# Patient Record
Sex: FEMALE | Race: WHITE | Employment: FULL TIME | URBAN - NONMETROPOLITAN AREA
[De-identification: names, ages, dates, MRNs, and addresses within clinical notes are randomized per-mention and may not be internally consistent; named-entity substitution may affect disease eponyms.]

---

## 2024-05-15 ENCOUNTER — HOSPITAL ENCOUNTER (OUTPATIENT)
Dept: CT IMAGING | Age: 59
Discharge: HOME | End: 2024-05-15
Attending: RADIOLOGY

## 2024-05-15 DIAGNOSIS — Z00.6 EXAMINATION FOR NORMAL COMPARISON FOR CLINICAL RESEARCH: ICD-10-CM

## 2024-05-16 ENCOUNTER — NURSE ONLY (OUTPATIENT)
Dept: LAB | Age: 59
End: 2024-05-16

## 2024-05-16 ENCOUNTER — OFFICE VISIT (OUTPATIENT)
Dept: PULMONOLOGY | Age: 59
End: 2024-05-16

## 2024-05-16 VITALS
TEMPERATURE: 97.3 F | HEART RATE: 90 BPM | DIASTOLIC BLOOD PRESSURE: 92 MMHG | SYSTOLIC BLOOD PRESSURE: 122 MMHG | BODY MASS INDEX: 33.75 KG/M2 | HEIGHT: 62 IN | WEIGHT: 183.4 LBS | OXYGEN SATURATION: 91 %

## 2024-05-16 DIAGNOSIS — R05.3 CHRONIC COUGH: Primary | ICD-10-CM

## 2024-05-16 DIAGNOSIS — R09.02 HYPOXIA: ICD-10-CM

## 2024-05-16 DIAGNOSIS — R91.8 ABNORMAL CT LUNG SCREENING: ICD-10-CM

## 2024-05-16 DIAGNOSIS — J47.0 BRONCHIECTASIS WITH ACUTE LOWER RESPIRATORY INFECTION (HCC): ICD-10-CM

## 2024-05-16 DIAGNOSIS — K21.9 GASTROESOPHAGEAL REFLUX DISEASE WITHOUT ESOPHAGITIS: ICD-10-CM

## 2024-05-16 PROBLEM — E66.01 CLASS 2 SEVERE OBESITY DUE TO EXCESS CALORIES WITH SERIOUS COMORBIDITY AND BODY MASS INDEX (BMI) OF 35.0 TO 35.9 IN ADULT (HCC): Chronic | Status: ACTIVE | Noted: 2021-08-03

## 2024-05-16 PROBLEM — E66.812 CLASS 2 SEVERE OBESITY DUE TO EXCESS CALORIES WITH SERIOUS COMORBIDITY AND BODY MASS INDEX (BMI) OF 35.0 TO 35.9 IN ADULT: Chronic | Status: ACTIVE | Noted: 2021-08-03

## 2024-05-16 PROBLEM — R73.03 PREDIABETES: Status: ACTIVE | Noted: 2020-06-30

## 2024-05-16 PROBLEM — E78.2 MIXED HYPERLIPIDEMIA: Chronic | Status: ACTIVE | Noted: 2020-03-04

## 2024-05-16 PROBLEM — F45.8 ANXIETY DIARRHEA: Chronic | Status: ACTIVE | Noted: 2020-03-04

## 2024-05-16 PROBLEM — G47.33 OSA ON CPAP: Status: ACTIVE | Noted: 2021-04-20

## 2024-05-16 RX ORDER — PRAVASTATIN SODIUM 40 MG
1 TABLET ORAL NIGHTLY
COMMUNITY
Start: 2024-04-29

## 2024-05-16 RX ORDER — SODIUM CHLORIDE 9 MG/ML
INJECTION, SOLUTION INTRAVENOUS CONTINUOUS
Status: DISCONTINUED | OUTPATIENT
Start: 2024-05-16 | End: 2024-05-17 | Stop reason: HOSPADM

## 2024-05-16 RX ORDER — PREDNISONE 50 MG/1
TABLET ORAL
Status: ON HOLD | COMMUNITY
Start: 2024-04-23 | End: 2024-05-18 | Stop reason: HOSPADM

## 2024-05-16 RX ORDER — SODIUM CHLORIDE FOR INHALATION 3 %
4 VIAL, NEBULIZER (ML) INHALATION 2 TIMES DAILY
Qty: 240 ML | Refills: 11 | Status: ON HOLD | OUTPATIENT
Start: 2024-05-16 | End: 2024-05-18 | Stop reason: HOSPADM

## 2024-05-16 RX ORDER — HYDROCHLOROTHIAZIDE 12.5 MG/1
12.5 TABLET ORAL DAILY
Status: ON HOLD | COMMUNITY
Start: 2024-01-31 | End: 2024-05-18 | Stop reason: HOSPADM

## 2024-05-16 RX ORDER — TRIAMCINOLONE ACETONIDE 1 MG/G
CREAM TOPICAL DAILY PRN
COMMUNITY
Start: 2022-08-09

## 2024-05-16 RX ORDER — OMEPRAZOLE 40 MG/1
40 CAPSULE, DELAYED RELEASE ORAL NIGHTLY
COMMUNITY
Start: 2024-05-02

## 2024-05-16 RX ORDER — CEFUROXIME AXETIL 500 MG/1
500 TABLET ORAL 2 TIMES DAILY
Status: ON HOLD | COMMUNITY
End: 2024-05-18 | Stop reason: HOSPADM

## 2024-05-16 RX ORDER — DEXTROMETHORPHAN HYDROBROMIDE AND PROMETHAZINE HYDROCHLORIDE 15; 6.25 MG/5ML; MG/5ML
SYRUP ORAL
Status: ON HOLD | COMMUNITY
Start: 2024-04-11 | End: 2024-05-18 | Stop reason: HOSPADM

## 2024-05-16 RX ORDER — CITALOPRAM HYDROBROMIDE 10 MG/1
TABLET ORAL
COMMUNITY
Start: 2024-04-17

## 2024-05-16 ASSESSMENT — ENCOUNTER SYMPTOMS
SHORTNESS OF BREATH: 1
HEARTBURN: 0
HEMOPTYSIS: 0
COUGH: 1

## 2024-05-16 NOTE — PROGRESS NOTES
[facility]    Patient: BABS MERCER, 58 y.o.   : 1965  2024         Subjective     Chief Complaint   Patient presents with    Follow-up     New pulm consult. Lung screenings from  &  in chart. Pft ordered not completed yet ( 5.22.24)        Cough  Episode onset: started around . The problem has been waxing and waning (depends on the part of the day,). The cough is Productive of sputum. Associated symptoms include shortness of breath (only after coughing), weight loss (almost 10 lbs) and wheezing (was wheezing before but has started to stop). Pertinent negatives include no chest pain, fever, heartburn, hemoptysis, nasal congestion, postnasal drip or rash. The symptoms are aggravated by lying down. She has tried oral steroids (tried allergy meds, then got steroid shot, no improvement. Got doxy and some prednisone without help.) for the symptoms. The treatment provided no relief.   No significant family history of pulmonary issues.  No prior lung issues per patient.  Does have smoking history however quit  and has not had any issues since.  No significant exposures at this time either.  Not immunosuppressed or immunocompromised.   Did have LDCT chest performed for lung cancer screening which did show some tree-in-bud opacities some mild bronchiectatic changes in the lower lobes and right middle lobe.  Bringing up significant mucus with her cough, no hemoptysis though.  Does have 10 pound weight loss unintentionally over the past few months    Patient has not been admitted or treated for pneumonia, pulmonary embolism, or respiratory failure.  Patient has not been intubated for reasons other than planned procedures.          Past Medical hx   PMH:  Past Medical History:   Diagnosis Date    Former smoker     Sleep apnea      SURGICAL HISTORY:  Past Surgical History:   Procedure Laterality Date    APPENDECTOMY      BOWEL RESECTION      CHOLECYSTECTOMY

## 2024-05-17 ENCOUNTER — ANESTHESIA EVENT (OUTPATIENT)
Dept: ENDOSCOPY | Age: 59
End: 2024-05-17
Payer: COMMERCIAL

## 2024-05-17 ENCOUNTER — APPOINTMENT (OUTPATIENT)
Dept: GENERAL RADIOLOGY | Age: 59
DRG: 166 | End: 2024-05-17
Attending: INTERNAL MEDICINE
Payer: COMMERCIAL

## 2024-05-17 ENCOUNTER — HOSPITAL ENCOUNTER (INPATIENT)
Age: 59
LOS: 1 days | Discharge: HOME | DRG: 166 | End: 2024-05-18
Attending: INTERNAL MEDICINE
Payer: COMMERCIAL

## 2024-05-17 ENCOUNTER — ANESTHESIA (OUTPATIENT)
Dept: ENDOSCOPY | Age: 59
End: 2024-05-17
Payer: COMMERCIAL

## 2024-05-17 PROBLEM — R93.89 ABNORMAL CT OF THE CHEST: Status: ACTIVE | Noted: 2024-05-17

## 2024-05-17 PROBLEM — J18.9 PNEUMONIA OF BOTH LUNGS DUE TO INFECTIOUS ORGANISM: Status: ACTIVE | Noted: 2024-05-17

## 2024-05-17 PROBLEM — R09.02 HYPOXIA: Status: ACTIVE | Noted: 2024-05-17

## 2024-05-17 PROBLEM — R05.3 CHRONIC COUGH: Status: ACTIVE | Noted: 2024-05-17

## 2024-05-17 PROBLEM — J96.01 ACUTE HYPOXIC RESPIRATORY FAILURE (HCC): Status: ACTIVE | Noted: 2024-05-17

## 2024-05-17 LAB
ACINETOBACTER CALCOACETICUS-BAUMANNII BY PCR: NOT DETECTED
ANION GAP SERPL CALC-SCNC: 10 MEQ/L (ref 8–16)
BAL CHARACTER: ABNORMAL
BAL COLLECTION SITE: ABNORMAL
BAL COLOR: COLORLESS
BASOPHILS ABSOLUTE: 0 THOU/MM3 (ref 0–0.1)
BASOPHILS NFR BLD AUTO: 0.4 %
BILIRUB UR QL STRIP.AUTO: NEGATIVE
BLACTX-M ISLT/SPM QL: NORMAL
BLAIMP ISLT/SPM QL: NORMAL
BLAKPC ISLT/SPM QL: NORMAL
BLAOXA-48-LIKE ISLT/SPM QL: NORMAL
BLAVIM ISLT/SPM QL: NORMAL
BUN SERPL-MCNC: 8 MG/DL (ref 7–22)
C PNEUM DNA LOWER RESP QL NAA+NON-PROBE: NOT DETECTED
CA-I BLD ISE-SCNC: 1.2 MMOL/L (ref 1.12–1.32)
CALCIUM SERPL-MCNC: 9 MG/DL (ref 8.5–10.5)
CHARACTER UR: CLEAR
CHLORIDE SERPL-SCNC: 105 MEQ/L (ref 98–111)
CO2 SERPL-SCNC: 25 MEQ/L (ref 23–33)
COLOR: YELLOW
CREAT SERPL-MCNC: 0.8 MG/DL (ref 0.4–1.2)
CYTOLOGY-NON GYN: NORMAL
CYTOLOGY-NON GYN: NORMAL
DEPRECATED RDW RBC AUTO: 47.7 FL (ref 35–45)
EKG ATRIAL RATE: 77 BPM
EKG P AXIS: 34 DEGREES
EKG P-R INTERVAL: 154 MS
EKG Q-T INTERVAL: 398 MS
EKG QRS DURATION: 84 MS
EKG QTC CALCULATION (BAZETT): 450 MS
EKG R AXIS: 23 DEGREES
EKG T AXIS: 26 DEGREES
EKG VENTRICULAR RATE: 77 BPM
ENTEROBACTER CLOACAE COMPLEX BY PCR: NOT DETECTED
EOSINOPHIL NFR BLD AUTO: 0.8 %
EOSINOPHILS ABSOLUTE: 0.1 THOU/MM3 (ref 0–0.4)
ERYTHROCYTE [DISTWIDTH] IN BLOOD BY AUTOMATED COUNT: 14.1 % (ref 11.5–14.5)
ERYTHROCYTE [SEDIMENTATION RATE] IN BLOOD BY WESTERGREN METHOD: 33 MM/HR (ref 0–20)
ESCHERICHIA COLI BY PCR: NOT DETECTED
FLUAV RNA LOWER RESP QL NAA+NON-PROBE: NOT DETECTED
FLUBV RNA LOWER RESP QL NAA+NON-PROBE: NOT DETECTED
FUNGUS SPEC FUNGUS STN: NORMAL
FUNGUS SPEC FUNGUS STN: NORMAL
GFR SERPL CREATININE-BSD FRML MDRD: 85 ML/MIN/1.73M2
GLUCOSE BLD STRIP.AUTO-MCNC: 142 MG/DL (ref 70–108)
GLUCOSE SERPL-MCNC: 121 MG/DL (ref 70–108)
GLUCOSE UR QL STRIP.AUTO: NEGATIVE MG/DL
HADV DNA LOWER RESP QL NAA+NON-PROBE: NOT DETECTED
HAEMOPHILUS INFLUENZAE BY PCR: NOT DETECTED
HCOV RNA LOWER RESP QL NAA+NON-PROBE: NOT DETECTED
HCT VFR BLD AUTO: 39.2 % (ref 37–47)
HGB BLD-MCNC: 12.6 GM/DL (ref 12–16)
HGB UR QL STRIP.AUTO: NEGATIVE
HMPV RNA LOWER RESP QL NAA+NON-PROBE: NOT DETECTED
HPIV RNA LOWER RESP QL NAA+NON-PROBE: NOT DETECTED
IGA SERPL-MCNC: 63 MG/DL (ref 70–400)
IGG SERPL-MCNC: 1056 MG/DL (ref 700–1600)
IGM SERPL-MCNC: 47 MG/DL (ref 40–230)
IMM GRANULOCYTES # BLD AUTO: 0.04 THOU/MM3 (ref 0–0.07)
IMM GRANULOCYTES NFR BLD AUTO: 0.4 %
KETONES UR QL STRIP.AUTO: NEGATIVE
KLEBSIELLA AEROGENES BY PCR: NOT DETECTED
KLEBSIELLA OXYTOCA BY PCR: NOT DETECTED
KLEBSIELLA PNEUMONIAE GROUP BY PCR: NOT DETECTED
L PNEUMO DNA LOWER RESP QL NAA+NON-PROBE: NOT DETECTED
L PNEUMO1 AG UR QL IA.RAPID: NEGATIVE
LACTATE SERPL-SCNC: 0.8 MMOL/L (ref 0.5–2)
LYMPHOCYTES ABSOLUTE: 0.6 THOU/MM3 (ref 1–4.8)
LYMPHOCYTES NFR BLD AUTO: 5.6 %
LYMPHOCYTES NFR BRONCH MANUAL: 10 % (ref 10–15)
M PNEUMO DNA LOWER RESP QL NAA+NON-PROBE: NOT DETECTED
MAGNESIUM SERPL-MCNC: 2.1 MG/DL (ref 1.6–2.4)
MCH RBC QN AUTO: 29.9 PG (ref 26–33)
MCHC RBC AUTO-ENTMCNC: 32.1 GM/DL (ref 32.2–35.5)
MCV RBC AUTO: 92.9 FL (ref 81–99)
MONOCYTES ABSOLUTE: 0.1 THOU/MM3 (ref 0.4–1.3)
MONOCYTES NFR BLD AUTO: 1.3 %
MORAXELLA CATARRHALIS BY PCR: NOT DETECTED
MRSA DNA SPEC QL NAA+PROBE: NEGATIVE
NEUTROPHILS ABSOLUTE: 9.1 THOU/MM3 (ref 1.8–7.7)
NEUTROPHILS NFR BLD AUTO: 91.5 %
NEUTROPHILS NFR BRONCH MANUAL: 90 % (ref 0–3)
NITRITE UR QL STRIP: NEGATIVE
NRBC BLD AUTO-RTO: 0 /100 WBC
PATHOLOGIST REVIEW: ABNORMAL
PH UR STRIP.AUTO: 7 [PH] (ref 5–9)
PLATELET # BLD AUTO: 257 THOU/MM3 (ref 130–400)
PMV BLD AUTO: 9 FL (ref 9.4–12.4)
POTASSIUM SERPL-SCNC: 4.3 MEQ/L (ref 3.5–5.2)
PROCALCITONIN SERPL IA-MCNC: 0.07 NG/ML (ref 0.01–0.09)
PROT UR STRIP.AUTO-MCNC: NEGATIVE MG/DL
PROTEUS SPECIES BY PCR: NOT DETECTED
PSEUDOMONAS AERUGINOSA BY PCR: NOT DETECTED
RBC # BLD AUTO: 4.22 MILL/MM3 (ref 4.2–5.4)
RBC BAL: 135 /CUMM
RESISTANT GENE MECA/C & MREJ BY PCR: NORMAL
RESISTANT GENE NDM BY PCR: NORMAL
RSV RNA LOWER RESP QL NAA+NON-PROBE: NOT DETECTED
RV+EV RNA LOWER RESP QL NAA+NON-PROBE: NOT DETECTED
SERRATIA MARCESCENS BY PCR: NOT DETECTED
SODIUM SERPL-SCNC: 140 MEQ/L (ref 135–145)
SOURCE: NORMAL
SP GR UR REFRACT.AUTO: 1.01 (ref 1–1.03)
SPECIMEN ACCEPTABILITY: NORMAL
SPECIMEN SOURCE: NORMAL
STAPH AUREUS BY PCR: NOT DETECTED
STREP AGALACTIAE BY PCR: NOT DETECTED
STREP PNEUMO AG, UR: NEGATIVE
STREP PNEUMONIAE BY PCR: NOT DETECTED
STREP PYOGENES BY PCR: NOT DETECTED
TOTAL NUCLEATED CELLS BAL: 4090 /CUMM
TOTAL VOLUME RECEIVED BAL: 53 ML
UROBILINOGEN, URINE: 0.2 EU/DL (ref 0–1)
WBC # BLD AUTO: 9.9 THOU/MM3 (ref 4.8–10.8)
WBC #/AREA URNS HPF: NEGATIVE /[HPF]

## 2024-05-17 PROCEDURE — 89051 BODY FLUID CELL COUNT: CPT

## 2024-05-17 PROCEDURE — 87305 ASPERGILLUS AG IA: CPT

## 2024-05-17 PROCEDURE — 85025 COMPLETE CBC W/AUTO DIFF WBC: CPT

## 2024-05-17 PROCEDURE — 93010 ELECTROCARDIOGRAM REPORT: CPT | Performed by: INTERNAL MEDICINE

## 2024-05-17 PROCEDURE — 94761 N-INVAS EAR/PLS OXIMETRY MLT: CPT

## 2024-05-17 PROCEDURE — 6370000000 HC RX 637 (ALT 250 FOR IP)

## 2024-05-17 PROCEDURE — 82784 ASSAY IGA/IGD/IGG/IGM EACH: CPT

## 2024-05-17 PROCEDURE — 2060000000 HC ICU INTERMEDIATE R&B

## 2024-05-17 PROCEDURE — 87077 CULTURE AEROBIC IDENTIFY: CPT

## 2024-05-17 PROCEDURE — 99222 1ST HOSP IP/OBS MODERATE 55: CPT | Performed by: STUDENT IN AN ORGANIZED HEALTH CARE EDUCATION/TRAINING PROGRAM

## 2024-05-17 PROCEDURE — 87070 CULTURE OTHR SPECIMN AEROBIC: CPT

## 2024-05-17 PROCEDURE — 87899 AGENT NOS ASSAY W/OPTIC: CPT

## 2024-05-17 PROCEDURE — 87449 NOS EACH ORGANISM AG IA: CPT

## 2024-05-17 PROCEDURE — 7100000000 HC PACU RECOVERY - FIRST 15 MIN: Performed by: INTERNAL MEDICINE

## 2024-05-17 PROCEDURE — 3700000001 HC ADD 15 MINUTES (ANESTHESIA): Performed by: INTERNAL MEDICINE

## 2024-05-17 PROCEDURE — 3609027000 HC BRONCHOSCOPY: Performed by: INTERNAL MEDICINE

## 2024-05-17 PROCEDURE — A4216 STERILE WATER/SALINE, 10 ML: HCPCS

## 2024-05-17 PROCEDURE — 87206 SMEAR FLUORESCENT/ACID STAI: CPT

## 2024-05-17 PROCEDURE — 83605 ASSAY OF LACTIC ACID: CPT

## 2024-05-17 PROCEDURE — 2580000003 HC RX 258

## 2024-05-17 PROCEDURE — 85651 RBC SED RATE NONAUTOMATED: CPT

## 2024-05-17 PROCEDURE — 87486 CHLMYD PNEUM DNA AMP PROBE: CPT

## 2024-05-17 PROCEDURE — 86038 ANTINUCLEAR ANTIBODIES: CPT

## 2024-05-17 PROCEDURE — 0WCQ8ZZ EXTIRPATION OF MATTER FROM RESPIRATORY TRACT, VIA NATURAL OR ARTIFICIAL OPENING ENDOSCOPIC: ICD-10-PCS | Performed by: INTERNAL MEDICINE

## 2024-05-17 PROCEDURE — 2580000003 HC RX 258: Performed by: INTERNAL MEDICINE

## 2024-05-17 PROCEDURE — 88305 TISSUE EXAM BY PATHOLOGIST: CPT

## 2024-05-17 PROCEDURE — 88312 SPECIAL STAINS GROUP 1: CPT

## 2024-05-17 PROCEDURE — 88185 FLOWCYTOMETRY/TC ADD-ON: CPT

## 2024-05-17 PROCEDURE — 5A09357 ASSISTANCE WITH RESPIRATORY VENTILATION, LESS THAN 24 CONSECUTIVE HOURS, CONTINUOUS POSITIVE AIRWAY PRESSURE: ICD-10-PCS | Performed by: INTERNAL MEDICINE

## 2024-05-17 PROCEDURE — 31624 DX BRONCHOSCOPE/LAVAGE: CPT | Performed by: INTERNAL MEDICINE

## 2024-05-17 PROCEDURE — 87798 DETECT AGENT NOS DNA AMP: CPT

## 2024-05-17 PROCEDURE — 82948 REAGENT STRIP/BLOOD GLUCOSE: CPT

## 2024-05-17 PROCEDURE — 87385 HISTOPLASMA CAPSUL AG IA: CPT

## 2024-05-17 PROCEDURE — 82330 ASSAY OF CALCIUM: CPT

## 2024-05-17 PROCEDURE — 82787 IGG 1 2 3 OR 4 EACH: CPT

## 2024-05-17 PROCEDURE — 87529 HSV DNA AMP PROBE: CPT

## 2024-05-17 PROCEDURE — 81003 URINALYSIS AUTO W/O SCOPE: CPT

## 2024-05-17 PROCEDURE — 2500000003 HC RX 250 WO HCPCS: Performed by: NURSE ANESTHETIST, CERTIFIED REGISTERED

## 2024-05-17 PROCEDURE — 2700000000 HC OXYGEN THERAPY PER DAY

## 2024-05-17 PROCEDURE — 88184 FLOWCYTOMETRY/ TC 1 MARKER: CPT

## 2024-05-17 PROCEDURE — 93005 ELECTROCARDIOGRAM TRACING: CPT

## 2024-05-17 PROCEDURE — 0B9D8ZX DRAINAGE OF RIGHT MIDDLE LUNG LOBE, VIA NATURAL OR ARTIFICIAL OPENING ENDOSCOPIC, DIAGNOSTIC: ICD-10-PCS | Performed by: INTERNAL MEDICINE

## 2024-05-17 PROCEDURE — 83735 ASSAY OF MAGNESIUM: CPT

## 2024-05-17 PROCEDURE — 88108 CYTOPATH CONCENTRATE TECH: CPT

## 2024-05-17 PROCEDURE — 2500000003 HC RX 250 WO HCPCS

## 2024-05-17 PROCEDURE — 6360000002 HC RX W HCPCS: Performed by: NURSE ANESTHETIST, CERTIFIED REGISTERED

## 2024-05-17 PROCEDURE — 99222 1ST HOSP IP/OBS MODERATE 55: CPT | Performed by: INTERNAL MEDICINE

## 2024-05-17 PROCEDURE — 2709999900 HC NON-CHARGEABLE SUPPLY: Performed by: INTERNAL MEDICINE

## 2024-05-17 PROCEDURE — 87205 SMEAR GRAM STAIN: CPT

## 2024-05-17 PROCEDURE — 87631 RESP VIRUS 3-5 TARGETS: CPT

## 2024-05-17 PROCEDURE — 88112 CYTOPATH CELL ENHANCE TECH: CPT

## 2024-05-17 PROCEDURE — 0B9J8ZX DRAINAGE OF LEFT LOWER LUNG LOBE, VIA NATURAL OR ARTIFICIAL OPENING ENDOSCOPIC, DIAGNOSTIC: ICD-10-PCS | Performed by: INTERNAL MEDICINE

## 2024-05-17 PROCEDURE — 31623 DX BRONCHOSCOPE/BRUSH: CPT | Performed by: INTERNAL MEDICINE

## 2024-05-17 PROCEDURE — 31645 BRNCHSC W/THER ASPIR 1ST: CPT | Performed by: INTERNAL MEDICINE

## 2024-05-17 PROCEDURE — 87581 M.PNEUMON DNA AMP PROBE: CPT

## 2024-05-17 PROCEDURE — 87102 FUNGUS ISOLATION CULTURE: CPT

## 2024-05-17 PROCEDURE — 71045 X-RAY EXAM CHEST 1 VIEW: CPT

## 2024-05-17 PROCEDURE — 94640 AIRWAY INHALATION TREATMENT: CPT

## 2024-05-17 PROCEDURE — 0BD88ZX EXTRACTION OF LEFT UPPER LOBE BRONCHUS, VIA NATURAL OR ARTIFICIAL OPENING ENDOSCOPIC, DIAGNOSTIC: ICD-10-PCS | Performed by: INTERNAL MEDICINE

## 2024-05-17 PROCEDURE — 6360000002 HC RX W HCPCS

## 2024-05-17 PROCEDURE — 87541 LEGION PNEUMO DNA AMP PROB: CPT

## 2024-05-17 PROCEDURE — 36415 COLL VENOUS BLD VENIPUNCTURE: CPT

## 2024-05-17 PROCEDURE — 87641 MR-STAPH DNA AMP PROBE: CPT

## 2024-05-17 PROCEDURE — 87040 BLOOD CULTURE FOR BACTERIA: CPT

## 2024-05-17 PROCEDURE — 87116 MYCOBACTERIA CULTURE: CPT

## 2024-05-17 PROCEDURE — 86200 CCP ANTIBODY: CPT

## 2024-05-17 PROCEDURE — 88104 CYTOPATH FL NONGYN SMEARS: CPT

## 2024-05-17 PROCEDURE — 6370000000 HC RX 637 (ALT 250 FOR IP): Performed by: INTERNAL MEDICINE

## 2024-05-17 PROCEDURE — 80048 BASIC METABOLIC PNL TOTAL CA: CPT

## 2024-05-17 PROCEDURE — 84145 PROCALCITONIN (PCT): CPT

## 2024-05-17 PROCEDURE — 87496 CYTOMEG DNA AMP PROBE: CPT

## 2024-05-17 PROCEDURE — 7100000001 HC PACU RECOVERY - ADDTL 15 MIN: Performed by: INTERNAL MEDICINE

## 2024-05-17 PROCEDURE — 3700000000 HC ANESTHESIA ATTENDED CARE: Performed by: INTERNAL MEDICINE

## 2024-05-17 RX ORDER — ONDANSETRON 4 MG/1
4 TABLET, ORALLY DISINTEGRATING ORAL EVERY 8 HOURS PRN
Status: DISCONTINUED | OUTPATIENT
Start: 2024-05-17 | End: 2024-05-18 | Stop reason: HOSPADM

## 2024-05-17 RX ORDER — IPRATROPIUM BROMIDE AND ALBUTEROL SULFATE 2.5; .5 MG/3ML; MG/3ML
SOLUTION RESPIRATORY (INHALATION)
Status: COMPLETED
Start: 2024-05-17 | End: 2024-05-17

## 2024-05-17 RX ORDER — SUCCINYLCHOLINE/SOD CL,ISO/PF 200MG/10ML
SYRINGE (ML) INTRAVENOUS PRN
Status: DISCONTINUED | OUTPATIENT
Start: 2024-05-17 | End: 2024-05-17 | Stop reason: SDUPTHER

## 2024-05-17 RX ORDER — HYDRALAZINE HYDROCHLORIDE 20 MG/ML
10 INJECTION INTRAMUSCULAR; INTRAVENOUS EVERY 6 HOURS PRN
Status: DISCONTINUED | OUTPATIENT
Start: 2024-05-17 | End: 2024-05-18 | Stop reason: HOSPADM

## 2024-05-17 RX ORDER — IPRATROPIUM BROMIDE AND ALBUTEROL SULFATE 2.5; .5 MG/3ML; MG/3ML
1 SOLUTION RESPIRATORY (INHALATION) ONCE
Status: COMPLETED | OUTPATIENT
Start: 2024-05-17 | End: 2024-05-17

## 2024-05-17 RX ORDER — ALBUTEROL SULFATE 2.5 MG/3ML
2.5 SOLUTION RESPIRATORY (INHALATION)
Status: DISCONTINUED | OUTPATIENT
Start: 2024-05-17 | End: 2024-05-17

## 2024-05-17 RX ORDER — SODIUM CHLORIDE 0.9 % (FLUSH) 0.9 %
5-40 SYRINGE (ML) INJECTION EVERY 12 HOURS SCHEDULED
Status: DISCONTINUED | OUTPATIENT
Start: 2024-05-17 | End: 2024-05-18 | Stop reason: HOSPADM

## 2024-05-17 RX ORDER — POTASSIUM CHLORIDE 20 MEQ/1
40 TABLET, EXTENDED RELEASE ORAL PRN
Status: DISCONTINUED | OUTPATIENT
Start: 2024-05-17 | End: 2024-05-18 | Stop reason: HOSPADM

## 2024-05-17 RX ORDER — DEXTROSE MONOHYDRATE 100 MG/ML
INJECTION, SOLUTION INTRAVENOUS CONTINUOUS PRN
Status: DISCONTINUED | OUTPATIENT
Start: 2024-05-17 | End: 2024-05-18 | Stop reason: HOSPADM

## 2024-05-17 RX ORDER — PANTOPRAZOLE SODIUM 40 MG/1
40 TABLET, DELAYED RELEASE ORAL
Status: DISCONTINUED | OUTPATIENT
Start: 2024-05-17 | End: 2024-05-18 | Stop reason: HOSPADM

## 2024-05-17 RX ORDER — ACETAMINOPHEN 325 MG/1
650 TABLET ORAL EVERY 6 HOURS PRN
Status: DISCONTINUED | OUTPATIENT
Start: 2024-05-17 | End: 2024-05-18 | Stop reason: HOSPADM

## 2024-05-17 RX ORDER — ONDANSETRON 2 MG/ML
4 INJECTION INTRAMUSCULAR; INTRAVENOUS EVERY 6 HOURS PRN
Status: DISCONTINUED | OUTPATIENT
Start: 2024-05-17 | End: 2024-05-18 | Stop reason: HOSPADM

## 2024-05-17 RX ORDER — CETIRIZINE HYDROCHLORIDE 10 MG/1
10 TABLET ORAL DAILY
Status: DISCONTINUED | OUTPATIENT
Start: 2024-05-17 | End: 2024-05-18 | Stop reason: HOSPADM

## 2024-05-17 RX ORDER — ACETAMINOPHEN 650 MG/1
650 SUPPOSITORY RECTAL EVERY 6 HOURS PRN
Status: DISCONTINUED | OUTPATIENT
Start: 2024-05-17 | End: 2024-05-18 | Stop reason: HOSPADM

## 2024-05-17 RX ORDER — POTASSIUM CHLORIDE 7.45 MG/ML
10 INJECTION INTRAVENOUS PRN
Status: DISCONTINUED | OUTPATIENT
Start: 2024-05-17 | End: 2024-05-18 | Stop reason: HOSPADM

## 2024-05-17 RX ORDER — SODIUM CHLORIDE 9 MG/ML
INJECTION, SOLUTION INTRAVENOUS PRN
Status: DISCONTINUED | OUTPATIENT
Start: 2024-05-17 | End: 2024-05-18 | Stop reason: HOSPADM

## 2024-05-17 RX ORDER — MIDAZOLAM HYDROCHLORIDE 1 MG/ML
INJECTION INTRAMUSCULAR; INTRAVENOUS PRN
Status: DISCONTINUED | OUTPATIENT
Start: 2024-05-17 | End: 2024-05-17 | Stop reason: SDUPTHER

## 2024-05-17 RX ORDER — SODIUM CHLORIDE 0.9 % (FLUSH) 0.9 %
5-40 SYRINGE (ML) INJECTION PRN
Status: DISCONTINUED | OUTPATIENT
Start: 2024-05-17 | End: 2024-05-18 | Stop reason: HOSPADM

## 2024-05-17 RX ORDER — FENTANYL CITRATE 50 UG/ML
INJECTION, SOLUTION INTRAMUSCULAR; INTRAVENOUS PRN
Status: DISCONTINUED | OUTPATIENT
Start: 2024-05-17 | End: 2024-05-17 | Stop reason: SDUPTHER

## 2024-05-17 RX ORDER — POLYETHYLENE GLYCOL 3350 17 G/17G
17 POWDER, FOR SOLUTION ORAL DAILY PRN
Status: DISCONTINUED | OUTPATIENT
Start: 2024-05-17 | End: 2024-05-18 | Stop reason: HOSPADM

## 2024-05-17 RX ORDER — CITALOPRAM 20 MG/1
10 TABLET ORAL DAILY
Status: DISCONTINUED | OUTPATIENT
Start: 2024-05-17 | End: 2024-05-18 | Stop reason: HOSPADM

## 2024-05-17 RX ORDER — LIDOCAINE HYDROCHLORIDE 20 MG/ML
INJECTION, SOLUTION EPIDURAL; INFILTRATION; INTRACAUDAL; PERINEURAL PRN
Status: DISCONTINUED | OUTPATIENT
Start: 2024-05-17 | End: 2024-05-17 | Stop reason: SDUPTHER

## 2024-05-17 RX ORDER — PROPOFOL 10 MG/ML
INJECTION, EMULSION INTRAVENOUS PRN
Status: DISCONTINUED | OUTPATIENT
Start: 2024-05-17 | End: 2024-05-17 | Stop reason: SDUPTHER

## 2024-05-17 RX ORDER — MAGNESIUM SULFATE IN WATER 40 MG/ML
2000 INJECTION, SOLUTION INTRAVENOUS PRN
Status: DISCONTINUED | OUTPATIENT
Start: 2024-05-17 | End: 2024-05-18 | Stop reason: HOSPADM

## 2024-05-17 RX ORDER — ONDANSETRON 2 MG/ML
INJECTION INTRAMUSCULAR; INTRAVENOUS PRN
Status: DISCONTINUED | OUTPATIENT
Start: 2024-05-17 | End: 2024-05-17 | Stop reason: SDUPTHER

## 2024-05-17 RX ORDER — IPRATROPIUM BROMIDE AND ALBUTEROL SULFATE 2.5; .5 MG/3ML; MG/3ML
1 SOLUTION RESPIRATORY (INHALATION)
Status: DISCONTINUED | OUTPATIENT
Start: 2024-05-17 | End: 2024-05-18 | Stop reason: HOSPADM

## 2024-05-17 RX ORDER — DEXAMETHASONE SODIUM PHOSPHATE 4 MG/ML
INJECTION, SOLUTION INTRA-ARTICULAR; INTRALESIONAL; INTRAMUSCULAR; INTRAVENOUS; SOFT TISSUE PRN
Status: DISCONTINUED | OUTPATIENT
Start: 2024-05-17 | End: 2024-05-17 | Stop reason: SDUPTHER

## 2024-05-17 RX ORDER — GLUCAGON 1 MG/ML
1 KIT INJECTION PRN
Status: DISCONTINUED | OUTPATIENT
Start: 2024-05-17 | End: 2024-05-18 | Stop reason: HOSPADM

## 2024-05-17 RX ORDER — PRAVASTATIN SODIUM 40 MG
40 TABLET ORAL NIGHTLY
Status: DISCONTINUED | OUTPATIENT
Start: 2024-05-17 | End: 2024-05-18 | Stop reason: HOSPADM

## 2024-05-17 RX ADMIN — PROPOFOL 200 MG: 10 INJECTION, EMULSION INTRAVENOUS at 07:40

## 2024-05-17 RX ADMIN — CITALOPRAM HYDROBROMIDE 10 MG: 20 TABLET ORAL at 14:17

## 2024-05-17 RX ADMIN — FAMOTIDINE 20 MG: 10 INJECTION, SOLUTION INTRAVENOUS at 20:56

## 2024-05-17 RX ADMIN — FENTANYL CITRATE 100 MCG: 50 INJECTION, SOLUTION INTRAMUSCULAR; INTRAVENOUS at 07:41

## 2024-05-17 RX ADMIN — SODIUM CHLORIDE: 9 INJECTION, SOLUTION INTRAVENOUS at 06:36

## 2024-05-17 RX ADMIN — Medication 140 MG: at 07:41

## 2024-05-17 RX ADMIN — IPRATROPIUM BROMIDE AND ALBUTEROL SULFATE 1 DOSE: 2.5; .5 SOLUTION RESPIRATORY (INHALATION) at 20:57

## 2024-05-17 RX ADMIN — LIDOCAINE HYDROCHLORIDE 80 MG: 20 INJECTION, SOLUTION EPIDURAL; INFILTRATION; INTRACAUDAL; PERINEURAL at 07:40

## 2024-05-17 RX ADMIN — IPRATROPIUM BROMIDE AND ALBUTEROL SULFATE 1 DOSE: 2.5; .5 SOLUTION RESPIRATORY (INHALATION) at 17:29

## 2024-05-17 RX ADMIN — PRAVASTATIN SODIUM 40 MG: 40 TABLET ORAL at 20:57

## 2024-05-17 RX ADMIN — CEFTRIAXONE SODIUM 1000 MG: 1 INJECTION, POWDER, FOR SOLUTION INTRAMUSCULAR; INTRAVENOUS at 14:17

## 2024-05-17 RX ADMIN — IPRATROPIUM BROMIDE AND ALBUTEROL SULFATE 1 DOSE: .5; 3 SOLUTION RESPIRATORY (INHALATION) at 08:25

## 2024-05-17 RX ADMIN — ACETAMINOPHEN 650 MG: 325 TABLET ORAL at 21:23

## 2024-05-17 RX ADMIN — MIDAZOLAM 2 MG: 1 INJECTION INTRAMUSCULAR; INTRAVENOUS at 07:37

## 2024-05-17 RX ADMIN — AZITHROMYCIN MONOHYDRATE 500 MG: 500 INJECTION, POWDER, LYOPHILIZED, FOR SOLUTION INTRAVENOUS at 15:02

## 2024-05-17 RX ADMIN — CETIRIZINE HYDROCHLORIDE 10 MG: 10 TABLET, FILM COATED ORAL at 14:17

## 2024-05-17 RX ADMIN — PANTOPRAZOLE SODIUM 40 MG: 40 TABLET, DELAYED RELEASE ORAL at 14:19

## 2024-05-17 RX ADMIN — ONDANSETRON 4 MG: 2 INJECTION INTRAMUSCULAR; INTRAVENOUS at 07:52

## 2024-05-17 RX ADMIN — IPRATROPIUM BROMIDE AND ALBUTEROL SULFATE 1 DOSE: 2.5; .5 SOLUTION RESPIRATORY (INHALATION) at 08:25

## 2024-05-17 RX ADMIN — DEXAMETHASONE SODIUM PHOSPHATE 8 MG: 4 INJECTION, SOLUTION INTRAMUSCULAR; INTRAVENOUS at 07:52

## 2024-05-17 RX ADMIN — ALBUTEROL SULFATE 2.5 MG: 2.5 SOLUTION RESPIRATORY (INHALATION) at 12:22

## 2024-05-17 ASSESSMENT — PAIN SCALES - GENERAL
PAINLEVEL_OUTOF10: 0
PAINLEVEL_OUTOF10: 3
PAINLEVEL_OUTOF10: 0
PAINLEVEL_OUTOF10: 0

## 2024-05-17 ASSESSMENT — ENCOUNTER SYMPTOMS
WHEEZING: 1
NAUSEA: 0
CHOKING: 0
SHORTNESS OF BREATH: 1
VOICE CHANGE: 0
STRIDOR: 0
DIARRHEA: 0
WHEEZING: 0
COUGH: 1
TROUBLE SWALLOWING: 0

## 2024-05-17 ASSESSMENT — PAIN DESCRIPTION - LOCATION: LOCATION: HEAD

## 2024-05-17 ASSESSMENT — PAIN SCALES - WONG BAKER
WONGBAKER_NUMERICALRESPONSE: NO HURT

## 2024-05-17 ASSESSMENT — PAIN - FUNCTIONAL ASSESSMENT: PAIN_FUNCTIONAL_ASSESSMENT: NONE - DENIES PAIN

## 2024-05-17 NOTE — H&P
History & Physical        Patient:  Jayda Gonclaves  YOB: 1965    MRN: [id number]     Acct: [id number]    PCP: Joesph Ruelas MD    Date of Admission: 5/17/2024    Date of Service: Pt seen/examined on 05/17/24  and Admitted to Inpatient with expected LOS greater than two midnights due to medical therapy.     Chief Complaint:  SOB    Assessment and plan.  Acute hypoxic respiratory failure, s/p bronchoscopy, EDAC, acute pneumonia, and underlying sleep apnea on CPAP  -On CPAP, with oxygen FiO2 60%, PAP 10.  Wean off as able  -Broad-spectrum antibiotics started ceftriaxone and azithromycin.  Awaiting BAL, blood cultures 1 and 2, MRSA screening tests.  Will optimize antibiotic therapy based on culture/sensitive findings.  -Breathing treatments, albuterol nebs 4 times daily.  -Antihistamines, cetirizine and Pepcid  -Pending BAL labs, Pro-Kaushik, BMP/CBC, magnesium.  -Pulmonology service following  -Telemetry, pulse ox.  Daily weights JOSH's.  -CBC BMP  HERMILO.  On CPAP  Primary hypertension.  Home meds resumed with parameters.  Hydralazine as needed IV for systolic blood pressure over 170 mmHg. Tele. Low salt diet  GERD.  Protonix 40 mg daily.  Anxiety/depression.  Celexa resumed.  Obesity.  BMI 33.3.  Will defer management for now.  Hx of Tobacco smoking.  Patient claims she quit years ago.    History Of Present Illness:    58 y.o. female who presented to [facility] with shortness of breath, productive cough.  Patient stated that she is sick for 5 to 6 weeks, underwent antibiotic therapy-doxycycline and prednisone, initially got better but symptoms remains.  Associated fatigue.  History of tobacco smoking.  Patient denies chest pain heart palpitations dizziness lightheadedness fever abdominal pain constipation/diarrhea, dysuria.  Patient had low-dose CT chest, for lung screening for cancer, it showed some areas of tree-in-bud opacity with mild bronchiectasis and lower lobes.  Patient  use included cigarettes. She started smoking about 39 years ago. She has a 21.0 pack-year smoking history. She has never used smokeless tobacco.  ETOH:   reports that she does not currently use alcohol.      Family History:       Reviewed in detail and negative for DM, CAD, Cancer, CVA. Positive as follows:        Problem Relation Age of Onset    No Known Problems Mother        Diet:  Diet NPO    REVIEW OF SYSTEMS:   All 13 review of symptoms are negative except as listed above in the HPI.    PHYSICAL EXAM:    /78   Pulse 87   Temp 97.1 °F (36.2 °C) (Temporal)   Resp 24   Ht 1.575 m (5' 2\")   Wt 82.6 kg (182 lb 3.2 oz)   SpO2 99%   BMI 33.32 kg/m²     General appearance: In mild distress, appears stated age and cooperative.  Appears obese, ill looking  HEENT:  Normal cephalic, atraumatic without obvious deformity. Pupils equal, round, and reactive to light.  Extra ocular muscles intact. Conjunctivae/corneas clear.  Neck: Supple, with full range of motion. No jugular venous distention. Trachea midline.  Respiratory: On CPAP.  Mild,  end inspiratory wheezings bilaterally.  No crackles  Cardiovascular:  Regular rate and rhythm with normal S1/S2 without murmurs, rubs or gallops.  Abdomen: Soft, non-tender, non-distended with normal bowel sounds.  Musculoskeletal:  No clubbing, cyanosis or edema bilaterally.  Full range of motion without deformity.  Skin: Skin color, texture, turgor normal.  No rashes or lesions.  Neurologic:  Neurovascularly intact without any focal sensory/motor deficits. Cranial nerves: II-XII intact, grossly non-focal.  Psychiatric:  Alert and oriented, thought content appropriate, normal insight  Capillary Refill: Brisk,< 3 seconds   Peripheral Pulses: +2 palpable, equal bilaterally       Labs:     No results for input(s): \"WBC\", \"HGB\", \"HCT\", \"PLT\" in the last 72 hours.  No results for input(s): \"NA\", \"K\", \"CL\", \"CO2\", \"BUN\", \"CREATININE\", \"CALCIUM\", \"PHOS\" in the last 72

## 2024-05-17 NOTE — PROGRESS NOTES
Patient in endo for bronchoscopy. Ambu aScope 4.2/2.2  used. BAL and Brushings  completed.   23 jars labeled and sent to lab. Procedure completed. Patient tolerated well.

## 2024-05-17 NOTE — CONSULTS
Pulmonary Medicine consult note      Patient - Jayda Goncalves   MRN -  [id number]   [facility] # - [id number]   - 1965      Date of Admission -  2024  6:10 AM  Date of evaluation -  2024  Room - [facility]   Hospital Day - 0  Consulting - Brock Ingram MD Primary Care Physician - Joesph Ruelas MD     Problem List      Active Hospital Problems    Diagnosis Date Noted    Hypoxia [R09.02] 2024    Acute hypoxic respiratory failure (HCC) [J96.01] 2024     Reason for Consult    Post bronch hypoxia   HPI   History Obtained From: Patient and electronic medical record.    Jayda Goncalves is a 58 y.o. female without much PMH besides BMI 33, and chronic cough established care with pulm clinic yesterday with new found hypoxia after 6 weeks of refractory cough with some sputum productions, weight loss (unintentional) and fatigue. She did have LDCT with some significant tree and bud opacities b/l lungs and had a bronch with BAL performed today. During bronch had significant purulent secretions with mucus plugging thoughout the airway as well as what looked like EDAC. In PACU pt was significantly hypoxic and required rescue bipap and was admitted to the hospital after that.       PMHx   Past Medical History      Diagnosis Date    Former smoker     Sleep apnea       Past Surgical History        Procedure Laterality Date    APPENDECTOMY      BOWEL RESECTION      BRONCH W LAVAGE  2024    CHOLECYSTECTOMY      HYSTERECTOMY (CERVIX STATUS UNKNOWN)      TONSILLECTOMY       Meds    Current Medications    sodium chloride flush  5-40 mL IntraVENous 2 times per day    albuterol  2.5 mg Nebulization 4x Daily RT    citalopram  10 mg Oral Daily    pravastatin  40 mg Oral Nightly    cefTRIAXone (ROCEPHIN) IV  1,000 mg IntraVENous Q24H    azithromycin  500 mg IntraVENous Q24H    pantoprazole  40 mg Oral QAM AC    cetirizine  10 mg Oral Daily    famotidine (PEPCID) injection  20 mg IntraVENous BID    Substance and Sexual Activity    Alcohol use: Not Currently    Drug use: Never    Sexual activity: Not Currently   Other Topics Concern    Not on file   Social History Narrative    Not on file     Social Determinants of Health     Financial Resource Strain: Not on file   Food Insecurity: No Food Insecurity (5/17/2024)    Hunger Vital Sign     Worried About Running Out of Food in the Last Year: Never true     Ran Out of Food in the Last Year: Never true   Transportation Needs: No Transportation Needs (5/17/2024)    PRAPARE - Transportation     Lack of Transportation (Medical): No     Lack of Transportation (Non-Medical): No   Physical Activity: Not on file   Stress: Not on file   Social Connections: Not on file   Intimate Partner Violence: Not on file   Housing Stability: Unknown (5/17/2024)    Housing Stability Vital Sign     Unable to Pay for Housing in the Last Year: No     Number of Places Lived in the Last Year: Not on file     Unstable Housing in the Last Year: No     Family History          Problem Relation Age of Onset    No Known Problems Mother         Review of systems   Review of Systems   Constitutional:  Positive for fatigue and unexpected weight change. Negative for chills and fever.   Eyes:  Negative for visual disturbance.   Respiratory:  Positive for cough, shortness of breath and wheezing.    Cardiovascular:  Negative for chest pain, palpitations and leg swelling.   Gastrointestinal:  Negative for diarrhea and nausea.   Musculoskeletal:  Negative for arthralgias.   Skin:  Negative for rash.   Neurological:  Negative for syncope.        Vitals     height is 1.575 m (5' 2\") and weight is 83.9 kg (184 lb 15.5 oz). Her axillary temperature is 98.7 °F (37.1 °C). Her blood pressure is 132/96 (abnormal) and her pulse is 78. Her respiration is 18 and oxygen saturation is 97%.   Body mass index is 33.83 kg/m².    SUPPLEMENTAL O2: O2 Flow Rate (L/min): 5 L/min     I/O      Intake/Output Summary (Last 24

## 2024-05-17 NOTE — PROGRESS NOTES
0813: Pt arrives to pacu, awakens to verbal stimuli. Pt increased to 6LN, frequent productive cough noted, suctioned pt upon arrival to pacu.   0825: Duoneb administered at this time  0835: Ice chips provided to patient, tolerated well  0845: Pt remains on 6LNC, pt sats will decrease to low 80's and come back up after a few seconds. Encouraged patient to continue to take deep breaths.   0855: Spoke to Dr. Mcarthur regarding patient oxygen status, stated to have patient be put on bipap and speak to Dr. Hu about admitting the patient. Called Dr. Hu and he agreed with Dr. Mcarthur and stated he will contact hospitalist for admission. RT contacted  0905: RT at bedside to place on bipap  0925: Dr. Zelaya at bedside  0930: Family updated on patient status and sent up to room  0940: Attempted to give report to [facility] and stated they are currently full. Contacted admitting and stated they will place patient on 4K.  0945: Called family to update on room change  0954: Report given to Benito on 4K. Awaiting room to be cleaned  0955: Medical imaging at bedside for chest x-ray  1015: Pt resting off and on with eyes closed, easily awakens to voice. Continues to deny pain.   1032: Pt transported to 4K in stable condition. VSS

## 2024-05-17 NOTE — PROGRESS NOTES
admitted to Endo department and admitted to Endo room 5  Plan of care reviewed with patient.   Call light within reach.   Bed in lowest position, locked, with one bed rail up.   Appropriate arm bands on patient.   Bathroom offered.   All questions and concerns of patient addressed    Name: Abbie  Relationship to patient:   Phone number: [phone]

## 2024-05-17 NOTE — ANESTHESIA POSTPROCEDURE EVALUATION
Department of Anesthesiology  Postprocedure Note    Patient: Jayda Goncalves  MRN: [id number]  YOB: 1965  Date of evaluation: 5/17/2024    Procedure Summary       Date: 05/17/24 Room / Location: [place] [address] / [facility]    Anesthesia Start: 0736 Anesthesia Stop: 0816    Procedure: BRONCHOSCOPY Diagnosis:       Chronic cough      Abnormal CT lung screening    Surgeons: Attila Hu MD Responsible Provider: Jeff Mcarthur MD    Anesthesia Type: general ASA Status: 2            Anesthesia Type: No value filed.    Aamir Phase I: Aamir Score: 9    Aamir Phase II:      Anesthesia Post Evaluation    Patient location during evaluation: PACU  Patient participation: complete - patient participated  Level of consciousness: awake and alert  Pain score: 0  Airway patency: patent  Nausea & Vomiting: no nausea and no vomiting  Cardiovascular status: blood pressure returned to baseline and hemodynamically stable  Respiratory status: acceptable, airway suctioned, nonlabored ventilation, spontaneous ventilation and nasal cannula  Hydration status: stable  Pain management: satisfactory to patient        No notable events documented.

## 2024-05-17 NOTE — H&P
Musculoskeletal:  Negative for arthralgias.   Skin:  Negative for rash.   Hematological:  Negative for adenopathy. Does not bruise/bleed easily.           Objective:   PHYSICAL EXAM:      VITALS:  BP (!) 132/96   Pulse 83   Temp 97.1 °F (36.2 °C) (Temporal)   Resp 18   Ht 1.575 m (5' 2\")   Wt 82.6 kg (182 lb 3.2 oz)   SpO2 93%   BMI 33.32 kg/m²   24HR INTAKE/OUTPUT:  No intake or output data in the 24 hours ending 05/17/24 0718  CURRENT PULSE OXIMETRY:  SpO2: 93 %  O2: .flow[[id number]:last    Physical Exam  Vitals and nursing note reviewed.   Constitutional:       Appearance: Normal appearance.   HENT:      Head: Normocephalic and atraumatic.   Eyes:      General: No scleral icterus.     Extraocular Movements: Extraocular movements intact.   Cardiovascular:      Rate and Rhythm: Normal rate and regular rhythm.      Pulses: Normal pulses.      Heart sounds: No murmur heard.  Pulmonary:      Effort: Pulmonary effort is normal. No respiratory distress.      Breath sounds: Normal breath sounds. No wheezing or rales.   Abdominal:      General: Abdomen is flat.      Palpations: Abdomen is soft.   Musculoskeletal:      Cervical back: Normal range of motion and neck supple.      Right lower leg: No edema.      Left lower leg: No edema.   Skin:     General: Skin is warm and dry.      Capillary Refill: Capillary refill takes less than 2 seconds.   Neurological:      General: No focal deficit present.      Mental Status: She is alert and oriented to person, place, and time.   Psychiatric:         Mood and Affect: Mood normal.         Behavior: Behavior normal.              Assessment/Plan   # Abnormal CT lung  # Hypoxia   # Chronic Cough   # Unintentional weight loss   # Pneumonia     - performing bronch with BAL, See procedure note to follow. Follow cultures                Attila Hu MD

## 2024-05-17 NOTE — ANESTHESIA PRE PROCEDURE
\"CALCIUM\", \"BILITOT\", \"ALKPHOS\", \"AST\", \"ALT\"    POC Tests: No results for input(s): \"POCGLU\", \"POCNA\", \"POCK\", \"POCCL\", \"POCBUN\", \"POCHEMO\", \"POCHCT\" in the last 72 hours.    Coags: No results found for: \"PROTIME\", \"INR\", \"APTT\"    HCG (If Applicable): No results found for: \"PREGTESTUR\", \"PREGSERUM\", \"HCG\", \"HCGQUANT\"     ABGs: No results found for: \"PHART\", \"PO2ART\", \"OKP0WRZ\", \"CEJ0DBK\", \"BEART\", \"F5IHQOSY\"     Type & Screen (If Applicable):  No results found for: \"LABABO\"    Drug/Infectious Status (If Applicable):  No results found for: \"HIV\", \"HEPCAB\"    COVID-19 Screening (If Applicable): No results found for: \"COVID19\"        Anesthesia Evaluation  Patient summary reviewed and Nursing notes reviewed  Airway: Mallampati: II  TM distance: <3 FB   Neck ROM: full  Mouth opening: < 3 FB   Dental:          Pulmonary:   (+)     sleep apnea: on CPAP,   decreased breath sounds: bilateral              Patient did not smoke on day of surgery.                 Cardiovascular:  Exercise tolerance: good (>4 METS)  (+) hypertension: moderate        Rhythm: regular  Rate: normal                    Neuro/Psych:               GI/Hepatic/Renal:   (+) GERD: well controlled          Endo/Other:                     Abdominal:       Abdomen: soft.      Vascular:          Other Findings:             Anesthesia Plan      general     ASA 2       Induction: intravenous.      Anesthetic plan and risks discussed with patient.      Plan discussed with attending.                    EDILBERTO Olson - ARUNA   5/17/2024

## 2024-05-17 NOTE — PROCEDURES
PROCEDURE NOTE  Date: 5/17/2024   Name: Jayda Goncalves  YOB: 1965    Bronchoscopy Procedure Note    Date of Operation: 5/17/2024    Pre-op Diagnosis: Hypoxia, abnormal CT chest, chronic cough, infectious pneumonia     Post-op Diagnosis: Infectious pneumonia     Surgeon: Attila Hu MD    Anesthesia: General endotracheal anesthesia    Operation: Flexible fiberoptic bronchoscopy, diagnostic, with therapeutic suctioning     Estimated Blood Loss: Minimal    Complications: none    Indications and History:  The patient is a 58 y.o. female with new onset hypoxia, SOB, chronic worsening cough, with an abnormal CT chest.  The risks, benefits, complications, treatment options and expected outcomes were discussed with the patient.  The possibilities of reaction to medication, pulmonary aspiration, perforation of a viscus, bleeding, failure to diagnose a condition and creating a complication requiring transfusion or operation were discussed with the patient who freely signed the consent.      Description of Procedure:  The patient was taken to endoscopy suite, identified as Jayda Goncalves and the procedure verified as Flexible Fiberoptic Bronchoscopy.  A Time Out was held and the above information confirmed.     Scope passed through the ETT. The scope was then passed into the trachea.  Careful inspection of the tracheal lumen was accomplished. The scope was sequentially passed into the left main and then left upper and lower bronchi and segmental bronchi. BAL was done in LLL and specimen sent.     The scope was then withdrawn and advanced into the right main bronchus and then into the RUL, RML, and RLL bronchi and segmental bronchi. BAL was done in RML and specimen sent.    Then therapeutic suctioning was performed and suctioned out significant mucus plugs throughout the tracheal bronchial tree.      Brushing was performed in the GIOVANNY for abnormal mucosa as well.     Endobronchial findings: Lots of dynamic airway  collapse as well. Edematous mucosa throughout   Trachea: Normal mucosa  Domitila: Normal mucosa  Right main bronchus: Collapse with mucus plugging  Right upper lobe bronchus: Collapse  Right Middle lobe bronchus: Collapse with mucus plugging  Right Lower lobe bronchus: Collapse with mucus plugging  Left main bronchus: Collapse with mucus plugging  Left upper lobe bronchus: Collapse with mucus plugging. Very friable mucosa, abnormal looking, Took cyto brush from here.   Left lower lobe bronchus: Collapse with mucus plugging    The Patient was taken to the Endoscopy Recovery area in satisfactory condition.    However in post op, was on 6L and desatting, placed on rescue bipap and will admit to the hospital. Did see signficant dynamic airway collapse, likely contributing to acute hypoxia.     Recommendation:    1. F/U on culture results  2. F/U on cytology results  3. F/U on labs    Attestation: I performed the procedure.    Attila Hu MD

## 2024-05-17 NOTE — PROCEDURES
PROCEDURE NOTE  Date: 5/17/2024   Name: Jayda CESAR Sacha  YOB: 1965    Procedures  EKG completed handed to Divya MAHONEY

## 2024-05-18 VITALS
HEART RATE: 90 BPM | OXYGEN SATURATION: 93 % | WEIGHT: 185.63 LBS | DIASTOLIC BLOOD PRESSURE: 72 MMHG | HEIGHT: 62 IN | RESPIRATION RATE: 20 BRPM | SYSTOLIC BLOOD PRESSURE: 134 MMHG | TEMPERATURE: 98.2 F | BODY MASS INDEX: 34.16 KG/M2

## 2024-05-18 PROBLEM — T17.500A MUCUS PLUGGING OF BRONCHI: Status: ACTIVE | Noted: 2024-05-18

## 2024-05-18 PROBLEM — J98.19 LUNG COLLAPSE: Status: ACTIVE | Noted: 2024-05-18

## 2024-05-18 LAB
A1AT SERPL-MCNC: 152 MG/DL (ref 90–200)
AFB CULTURE & SMEAR: NORMAL
AFB CULTURE & SMEAR: NORMAL
ANION GAP SERPL CALC-SCNC: 9 MEQ/L (ref 8–16)
BASOPHILS ABSOLUTE: 0 THOU/MM3 (ref 0–0.1)
BASOPHILS NFR BLD AUTO: 0.2 %
BUN SERPL-MCNC: 9 MG/DL (ref 7–22)
CALCIUM SERPL-MCNC: 9.1 MG/DL (ref 8.5–10.5)
CHLORIDE SERPL-SCNC: 104 MEQ/L (ref 98–111)
CO2 SERPL-SCNC: 29 MEQ/L (ref 23–33)
CREAT SERPL-MCNC: 0.7 MG/DL (ref 0.4–1.2)
DEPRECATED RDW RBC AUTO: 47.7 FL (ref 35–45)
EOSINOPHIL NFR BLD AUTO: 0.4 %
EOSINOPHILS ABSOLUTE: 0 THOU/MM3 (ref 0–0.4)
ERYTHROCYTE [DISTWIDTH] IN BLOOD BY AUTOMATED COUNT: 13.9 % (ref 11.5–14.5)
GFR SERPL CREATININE-BSD FRML MDRD: > 90 ML/MIN/1.73M2
GLUCOSE BLD STRIP.AUTO-MCNC: 101 MG/DL (ref 70–108)
GLUCOSE BLD STRIP.AUTO-MCNC: 102 MG/DL (ref 70–108)
GLUCOSE SERPL-MCNC: 114 MG/DL (ref 70–108)
HAV IGM SER QL: NEGATIVE
HBV CORE IGM SERPL QL IA: NEGATIVE
HBV SURFACE AG SERPL QL IA: NEGATIVE
HCT VFR BLD AUTO: 37.4 % (ref 37–47)
HCV IGG SERPL QL IA: NEGATIVE
HGB BLD-MCNC: 11.8 GM/DL (ref 12–16)
IMM GRANULOCYTES # BLD AUTO: 0.06 THOU/MM3 (ref 0–0.07)
IMM GRANULOCYTES NFR BLD AUTO: 0.6 %
LYMPHOCYTES ABSOLUTE: 1 THOU/MM3 (ref 1–4.8)
LYMPHOCYTES NFR BLD AUTO: 10.8 %
MCH RBC QN AUTO: 29.7 PG (ref 26–33)
MCHC RBC AUTO-ENTMCNC: 31.6 GM/DL (ref 32.2–35.5)
MCV RBC AUTO: 94.2 FL (ref 81–99)
MONOCYTES ABSOLUTE: 0.6 THOU/MM3 (ref 0.4–1.3)
MONOCYTES NFR BLD AUTO: 6 %
NEUTROPHILS ABSOLUTE: 8 THOU/MM3 (ref 1.8–7.7)
NEUTROPHILS NFR BLD AUTO: 82 %
NRBC BLD AUTO-RTO: 0 /100 WBC
PLATELET # BLD AUTO: 283 THOU/MM3 (ref 130–400)
PMV BLD AUTO: 9.3 FL (ref 9.4–12.4)
POTASSIUM SERPL-SCNC: 4.3 MEQ/L (ref 3.5–5.2)
RBC # BLD AUTO: 3.97 MILL/MM3 (ref 4.2–5.4)
RHEUMATOID FACT SERPL-ACNC: 11 IU/ML (ref 0–13)
SODIUM SERPL-SCNC: 142 MEQ/L (ref 135–145)
WBC # BLD AUTO: 9.7 THOU/MM3 (ref 4.8–10.8)

## 2024-05-18 PROCEDURE — 6370000000 HC RX 637 (ALT 250 FOR IP)

## 2024-05-18 PROCEDURE — 2500000003 HC RX 250 WO HCPCS

## 2024-05-18 PROCEDURE — 2580000003 HC RX 258

## 2024-05-18 PROCEDURE — 2700000000 HC OXYGEN THERAPY PER DAY

## 2024-05-18 PROCEDURE — 86430 RHEUMATOID FACTOR TEST QUAL: CPT

## 2024-05-18 PROCEDURE — 94640 AIRWAY INHALATION TREATMENT: CPT

## 2024-05-18 PROCEDURE — 85025 COMPLETE CBC W/AUTO DIFF WBC: CPT

## 2024-05-18 PROCEDURE — A4216 STERILE WATER/SALINE, 10 ML: HCPCS

## 2024-05-18 PROCEDURE — 99232 SBSQ HOSP IP/OBS MODERATE 35: CPT

## 2024-05-18 PROCEDURE — 82103 ALPHA-1-ANTITRYPSIN TOTAL: CPT

## 2024-05-18 PROCEDURE — 82948 REAGENT STRIP/BLOOD GLUCOSE: CPT

## 2024-05-18 PROCEDURE — 94669 MECHANICAL CHEST WALL OSCILL: CPT

## 2024-05-18 PROCEDURE — 6370000000 HC RX 637 (ALT 250 FOR IP): Performed by: INTERNAL MEDICINE

## 2024-05-18 PROCEDURE — 99239 HOSP IP/OBS DSCHRG MGMT >30: CPT | Performed by: HOSPITALIST

## 2024-05-18 PROCEDURE — 82164 ANGIOTENSIN I ENZYME TEST: CPT

## 2024-05-18 PROCEDURE — 86255 FLUORESCENT ANTIBODY SCREEN: CPT

## 2024-05-18 PROCEDURE — 94761 N-INVAS EAR/PLS OXIMETRY MLT: CPT

## 2024-05-18 PROCEDURE — 80048 BASIC METABOLIC PNL TOTAL CA: CPT

## 2024-05-18 PROCEDURE — 87389 HIV-1 AG W/HIV-1&-2 AB AG IA: CPT

## 2024-05-18 PROCEDURE — 80074 ACUTE HEPATITIS PANEL: CPT

## 2024-05-18 PROCEDURE — 36415 COLL VENOUS BLD VENIPUNCTURE: CPT

## 2024-05-18 RX ORDER — CETIRIZINE HYDROCHLORIDE 10 MG/1
10 TABLET ORAL DAILY
Qty: 30 TABLET | Refills: 0 | Status: SHIPPED | OUTPATIENT
Start: 2024-05-19 | End: 2024-06-18

## 2024-05-18 RX ORDER — SODIUM CHLORIDE FOR INHALATION 3 %
4 VIAL, NEBULIZER (ML) INHALATION PRN
Qty: 15 ML | Refills: 0 | Status: SHIPPED | OUTPATIENT
Start: 2024-05-18 | End: 2024-05-18

## 2024-05-18 RX ORDER — FAMOTIDINE 20 MG/1
20 TABLET, FILM COATED ORAL 2 TIMES DAILY
Status: DISCONTINUED | OUTPATIENT
Start: 2024-05-18 | End: 2024-05-18 | Stop reason: HOSPADM

## 2024-05-18 RX ORDER — ALBUTEROL SULFATE 2.5 MG/3ML
2.5 SOLUTION RESPIRATORY (INHALATION) EVERY 4 HOURS PRN
Qty: 180 ML | Refills: 3 | Status: SHIPPED | OUTPATIENT
Start: 2024-05-18

## 2024-05-18 RX ORDER — SODIUM CHLORIDE FOR INHALATION 3 %
4 VIAL, NEBULIZER (ML) INHALATION EVERY 6 HOURS PRN
Qty: 15 ML | Refills: 0 | Status: SHIPPED | OUTPATIENT
Start: 2024-05-18 | End: 2024-06-17

## 2024-05-18 RX ADMIN — PANTOPRAZOLE SODIUM 40 MG: 40 TABLET, DELAYED RELEASE ORAL at 05:12

## 2024-05-18 RX ADMIN — IPRATROPIUM BROMIDE AND ALBUTEROL SULFATE 1 DOSE: 2.5; .5 SOLUTION RESPIRATORY (INHALATION) at 16:03

## 2024-05-18 RX ADMIN — IPRATROPIUM BROMIDE AND ALBUTEROL SULFATE 1 DOSE: 2.5; .5 SOLUTION RESPIRATORY (INHALATION) at 08:53

## 2024-05-18 RX ADMIN — SODIUM CHLORIDE, PRESERVATIVE FREE 10 ML: 5 INJECTION INTRAVENOUS at 09:16

## 2024-05-18 RX ADMIN — CETIRIZINE HYDROCHLORIDE 10 MG: 10 TABLET, FILM COATED ORAL at 09:15

## 2024-05-18 RX ADMIN — FAMOTIDINE 20 MG: 20 TABLET, FILM COATED ORAL at 12:35

## 2024-05-18 RX ADMIN — IPRATROPIUM BROMIDE AND ALBUTEROL SULFATE 1 DOSE: 2.5; .5 SOLUTION RESPIRATORY (INHALATION) at 13:15

## 2024-05-18 RX ADMIN — CITALOPRAM HYDROBROMIDE 10 MG: 20 TABLET ORAL at 09:15

## 2024-05-18 ASSESSMENT — PAIN SCALES - GENERAL: PAINLEVEL_OUTOF10: 0

## 2024-05-18 NOTE — PLAN OF CARE
Problem: Discharge Planning  Goal: Discharge to home or other facility with appropriate resources  Outcome: Progressing     Problem: Safety - Adult  Goal: Free from fall injury  Outcome: Progressing     Problem: Pain  Goal: Verbalizes/displays adequate comfort level or baseline comfort level  Outcome: Progressing  Care plan reviewed with patient.  Patient verbalize understanding of the plan of care and contribute to goal setting.

## 2024-05-18 NOTE — DISCHARGE INSTRUCTIONS
Follow up with pulmonary for total results for bronchoscopy. Follow up with PCP in 1 week.    Oxygen requirements=   Room Air at rest, 2 L Nasal Cannula when up and walking.

## 2024-05-18 NOTE — PLAN OF CARE
Problem: Discharge Planning  Goal: Discharge to home or other facility with appropriate resources  Outcome: Adequate for Discharge     Problem: Safety - Adult  Goal: Free from fall injury  Outcome: Adequate for Discharge     Problem: Pain  Goal: Verbalizes/displays adequate comfort level or baseline comfort level  Outcome: Adequate for Discharge     Problem: Respiratory - Adult  Goal: Clear lung sounds  5/18/2024 0857 by Pauline Montaño RCP  Outcome: Progressing   Care plan reviewed with patient.  Patient verbalizes understanding of the plan of care and contribute to goal setting.

## 2024-05-18 NOTE — PROGRESS NOTES
A home oxygen evaluation has been completed.     [x]Patient is an inpatient. It is expected that the patient will be discharged within the next 48 hours. Qualified provider to write order for home prescription if patient qualifies. Social service/care managers will arrange for home oxygen.  If patient is active, arrange for Home Medical supplier to assess for Oxygen Conserving Device per pulse oximetry.  []Patient is an outpatient. Results will be faxed to the ordering provider. Qualified provider to write order for home prescription if patient qualifies and arranges for home oxygen.    Patient was placed on room air for 5 minutes. SpO2 was 91 % on room air at rest. Patients SpO2 was 89% or above and did not qualify for home oxygen. Patient was walked for 5 minutes. SpO2 was 85 % during walking. Patients SpO2 was below 89% and qualified for home oxygen. Oxygen was applied at 2 lpm via nasal cannula to maintain a SpO2 between 90-92% while walking. Actual SpO2 was 92 %.      Note: For any SpO2 at 89% see policy and procedure for possible qualifications.

## 2024-05-18 NOTE — PROGRESS NOTES
Discharge education and instructions provided. Patient verbalized understanding at this time. No questions asked. IV access removed. All personal belongings, AVS and present with patient. Transport to main lobby.

## 2024-05-18 NOTE — PROGRESS NOTES
Pulmonary Medicine consult note      Patient - Jayda Goncalves   MRN -  [id number]   [facility] # - [id number]   - 1965      Date of Admission -  2024  6:10 AM  Date of evaluation -  2024  Room - [facility]028-A   Hospital Day - 1  Consulting - Corinne Srivastava MD Primary Care Physician - Joesph Ruelas MD     Problem List      Active Hospital Problems    Diagnosis Date Noted    Hypoxia [R09.02] 2024    Acute hypoxic respiratory failure (HCC) [J96.01] 2024    Chronic cough [R05.3] 2024    Abnormal CT of the chest [R93.89] 2024    Pneumonia of both lungs due to infectious organism [J18.9] 2024     Reason for Consult    Post bronch hypoxia  HPI   History Obtained From: Patient and electronic medical record.    Jayda Goncalves is a 58 y.o. female without much PMH besides BMI 33, and chronic cough established care with pulm clinic yesterday with new found hypoxia after 6 weeks of refractory cough with some sputum productions, weight loss (unintentional) and fatigue. She did have LDCT with some significant tree and bud opacities b/l lungs and had a bronch with BAL performed today. During bronch had significant purulent secretions with mucus plugging thoughout the airway as well as what looked like EDAC. In PACU pt was significantly hypoxic and required rescue bipap and was admitted to the hospital after that.     Past 24 hrs   -Resting in bed on 1 lpm   -Patient continues with productive cough with white sputum and 1 streak of blood  -Afebrile overnight   -Patient reports today that her symptoms started after she had cleaned her shower that had mold in it with ZEPP  on , , . She did not wear a mask and area was not well ventilated     All other systems reviewed   PMHx   Past Medical History      Diagnosis Date    Former smoker     Sleep apnea       Past Surgical History        Procedure Laterality Date    APPENDECTOMY      BOWEL RESECTION      BRONCH W LAVAGE  reports her sleep apnea was \"more than moderate\". She does have a CPAP at home  Labs/Cultures    BAL cell count and dif: pending  PJP stain: pending  AFB stain/culture: No AFB (concentrated smear)  Fungal cultures: pending  Mod zinc nocardia: pending  PNA panel: LLL - neg  BAL cultures : normal paolo prelim  Cytology: pending  Flow cytometry: pending   CMV: pending  HSV: pending  Histo ag from BAL: pending  Molecular pneumonia panel - negative    ANCA - pending  ERICA - pending  ACE - pending  CCP - pending  Rheumatoid factor - pending  IgG - normal 1056  IgG subclasses - pending  IgA - mildly low 63  IgM - normal 47    Legionella - negative  UA - normal  Step pneumoniae - negative  MRSA - negative  Respiratory culture - normal paolo - prelim  Blood cultures - pending  EKG   NSR, no TW/ST changes    Echocardiogram   none    Radiology    CXR  PROCEDURE: XR CHEST PORTABLE     CLINICAL INFORMATION: hypoxia     COMPARISON: No prior study.     TECHNIQUE: A single mobile view of the chest was obtained.        IMPRESSION:  1. Normal heart size. No effusion.  2. Slight groundglass appearance of both lung bases, consistent with mild  interstitial pneumonitis.           **This report has been created using voice recognition software.  It may contain  minor errors which are inherent in voice recognition technology.**       CT Scans  (See actual reports for details)    Assessment   # Acute hypoxic respiratory failure post bronchoscopy  # Productive cough  # Pneumonia   # EDAC  # Sleep apnea - Patient gives hx of sleep apnea diagnosed at [facility]. She reports that she was started on a CPAP but is uncertain her pressure settings. She states she was taken off of amitriptyline and family members noticed she was no longer stopping breathing when she was off of her PAP. She admits she was not wearing her PAP machine until she started feeling ill with productive cough. She does express interest in resuming

## 2024-05-18 NOTE — DISCHARGE SUMMARY
Resident Discharge Summary (Hospitalist)      Patient Identification:   Jayda Goncalves   : 1965  MRN: [id number]   Account: [id number]   Patient's PCP: Joesph Ruelas MD    Admit Date: 2024   Discharge Date: 2024      Admitting Physician: No admitting provider for patient encounter.  Discharge Physician: Luis Eduardo Carpenter DO       Discharge Diagnoses:  Acute hypoxic respiratory failure, s/p bronchoscopy, EDAC, acute pneumonia, and underlying sleep apnea on CPAP.  Weaned down to 2 L nasal cannula.  Discontinued antibiotics prior to discharge.  Hepatitis panel negative  Pulmonology plans to follow-up on all lab work results in 2 weeks.  Will discharge with albuterol nebs and nasal saline nebs  Okay to discharge from pulmonology perspective  We will continue with antihistamines  Home O2 eval prior to discharge  HIV ordered follow-up outpatient with PCP  HERMILO:  On CPAP  Primary hypertension.  Continue home meds  GERD.  Protonix 40 mg daily.  Anxiety/depression.  Celexa resumed.  Obesity.  BMI 33.3.  Will defer management for now.  Hx of Tobacco smoking.  Patient claims she quit years ago.      Hospital Course:    58 y.o. female who presented to [facility] with shortness of breath, productive cough.  Patient stated that she is sick for 5 to 6 weeks, underwent antibiotic therapy-doxycycline and prednisone, initially got better but symptoms remains.  Associated fatigue.  History of tobacco smoking.  Patient denies chest pain heart palpitations dizziness lightheadedness fever abdominal pain constipation/diarrhea, dysuria.  Patient had low-dose CT chest, for lung screening for cancer, it showed some areas of tree-in-bud opacity with mild bronchiectasis and lower lobes.  Patient initially brought to the hospital for bronchoscopy as outpatient procedure, after procedure patient feels shortness of breath and required BiPAP/CPAP.  Pulmonology reports highly suspicious for Edac.  Patient  is admitted for further evaluation and management of acute hypoxic respiratory failure      The patient was seen and examined on day of discharge and this discharge summary is in conjunction with any daily progress note from day of discharge.    The patient is discharged in stable condition.       Exam:   Vitals:  Vitals:    05/18/24 0900 05/18/24 1100 05/18/24 1318 05/18/24 1606   BP: 134/72      Pulse: 90      Resp: 20      Temp:       TempSrc: Oral      SpO2: 91% 94% 94% 93%   Weight:       Height:         Weight: Weight - Scale: 84.2 kg (185 lb 10 oz)     General appearance: No apparent distress, well developed, appears stated age.  On 2 L nasal cannula  Eyes:  Pupils equal, round, and reactive to light. Conjunctivae/corneas clear.  HENT: Head normal in appearance. External nares normal.  Oral mucosa moist without lesions.  Hearing grossly intact.   Neck: Supple, with full range of motion. Trachea midline.  No gross JVD appreciated.  Respiratory:  Normal respiratory effort. Clear to auscultation, bilaterally without rales or wheezes or rhonchi.  Cardiovascular: Normal rate, regular rhythm with normal S1/S2 without murmurs.  No lower extremity edema.   Abdomen: Soft, non-tender, non-distended with normal bowel sounds.  Musculoskeletal: There is no joint swelling or tenderness. Normal tone. No abnormal movements.   Skin: Warm and dry. No rashes or lesions.  Neurologic:  No focal sensory/motor deficits in the upper and lower extremities. Cranial nerves:  grossly non-focal 2-12.     Psychiatric: Alert and oriented, normal insight and thought content.   Capillary Refill: Brisk,< 3 seconds.  Peripheral Pulses: +2 palpable, equal bilaterally.       Labs: For convenience the most recent labs are provided:  CBC:    Lab Results   Component Value Date/Time    WBC 9.7 05/18/2024 04:55 AM    HGB 11.8 05/18/2024 04:55 AM    HCT 37.4 05/18/2024 04:55 AM     05/18/2024 04:55 AM     Renal:    Lab Results   Component Value

## 2024-05-18 NOTE — PLAN OF CARE
Problem: Respiratory - Adult  Goal: Clear lung sounds  Outcome: Progressing   Pt continues on aerosols and acapella to help clear secretions and to clear lung sounds/improve aeration. Patient mutually agreed on goals.

## 2024-05-19 LAB
ACID FAST MOD KINY STN SPEC: NORMAL
BACTERIA BLD AEROBE CULT: NORMAL
BACTERIA BLD AEROBE CULT: NORMAL
BACTERIA SPEC RESP CULT: NORMAL
BACTERIA SPEC RESP CULT: NORMAL
GRAM STN SPEC: NORMAL
GRAM STN SPEC: NORMAL
H CAPSUL AG UR QL IA: NOT DETECTED
H CAPSUL AG UR-MCNC: NOT DETECTED NG/ML
HIV 1+2 AB+HIV1 P24 AG SERPL QL IA: NORMAL
IGG SUBCLASSES: NORMAL
LEUKEMIA/LYMPHOMA PHENOTYPING MISC: NORMAL
MISC. #1 REFERENCE GROUP TEST: NORMAL
NUCLEAR IGG SER QL IA: NORMAL

## 2024-05-20 ENCOUNTER — TELEPHONE (OUTPATIENT)
Dept: PULMONOLOGY | Age: 59
End: 2024-05-20

## 2024-05-20 LAB
ACE SERPL-CCNC: 15 U/L (ref 16–85)
AFB CULTURE & SMEAR: NORMAL
AFB CULTURE & SMEAR: NORMAL
ANCA AB PATTERN SER IF-IMP: NORMAL
ANCA IGG TITR SER IF: NORMAL {TITER}
BACTERIA SPEC RESP CULT: NORMAL
CMV DNA SPEC QL NAA+PROBE: NOT DETECTED
CYCLIC CITRULLINATED PEPTIDE ANTIBODY IGG: 1 U/ML (ref 0–7)
FUNGUS SPEC CULT: NORMAL
FUNGUS SPEC CULT: NORMAL
FUNGUS SPEC FUNGUS STN: NORMAL
FUNGUS SPEC FUNGUS STN: NORMAL
GRAM STN SPEC: NORMAL
HSV1 DNA SPEC QL NAA+PROBE: NOT DETECTED
HSV2 DNA SPEC QL NAA+PROBE: NOT DETECTED
SPECIMEN SOURCE: NORMAL

## 2024-05-20 NOTE — TELEPHONE ENCOUNTER
Patient scheduled for CTHR and Echo the end of June before her follow up in July. She did wish to cancel her PFT she had scheduled in [place] at this time due to her breathing not being the best at this time.     Patient is aware of all follow up and testing appointments and what they are for.   She verbalized understanding and will call with any further questions of concerns.

## 2024-05-20 NOTE — TELEPHONE ENCOUNTER
----- Message from Attila Hu MD sent at 5/20/2024  2:19 PM EDT -----  Regarding: RE: Phone number  Awesome, thanks, can we get her set up with an echo, high resolution CT chest, and sleep clinic referral I put those orders in. Ideally I would like this before I see her next visit in July. Thank you so much.  Attila Hu  ----- Message -----  From: Chantelle Dee LPN  Sent: 5/20/2024  12:49 PM EDT  To: Attila Hu MD  Subject: FW: Phone number                                 Her number is [phone]  Thanks!   ----- Message -----  From: Attila Hu MD  Sent: 5/20/2024  12:20 PM EDT  To: Chantelle Dee LPN  Subject: Phone number                                     Did you get my message? What’s her phone number?

## 2024-05-20 NOTE — TELEPHONE ENCOUNTER
----- Message from EDILBERTO Rivera CNP sent at 5/18/2024  1:20 PM EDT -----  Patient needs hospital follow up on bronch results in 2 weeks in pulm clinic.    Patient needs hospital follow up for sleep apnea in 1 month in sleep clinic. Need sleep study records from [facility] around 2 years ago

## 2024-05-20 NOTE — TELEPHONE ENCOUNTER
Patient is calling and wanting to know if she should still get PFT done on Wednesday 05/22/2024, please advise

## 2024-05-21 LAB
BACTERIA SPEC RESP CULT: NORMAL
GRAM STN SPEC: NORMAL

## 2024-05-22 LAB
BACTERIA BLD AEROBE CULT: NORMAL
BACTERIA BLD AEROBE CULT: NORMAL

## 2024-05-27 LAB
FUNGUS SPEC CULT: NORMAL
FUNGUS SPEC FUNGUS STN: NORMAL

## 2024-05-28 LAB
AFB CULTURE & SMEAR: NORMAL
AFB CULTURE & SMEAR: NORMAL

## 2024-05-29 ENCOUNTER — TELEPHONE (OUTPATIENT)
Dept: PULMONOLOGY | Age: 59
End: 2024-05-29

## 2024-05-29 NOTE — TELEPHONE ENCOUNTER
Called patient after speaking with Jeaneth Moeller. Patient was already given bronch results over the phone by Dr. Hu (Per Jeaneth/Uzma).  Patient did not need to keep HFU scheduled for tomorrow 5.30.24. She should follow up in July after her testing at her previously scheduled appt with Dr. Hu. I called to inform patient of this and she was agreeable to r/s her appt but stated that she needed work restrictions. 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 I spoke with Jeaneth regarding this and she decided this was best for Dr. Hu to address this issue as this patient does not have a dx and is waiting on testing . I informed the patient that since there is no pulm dx and we are still waiting on testing , Jeaneth would not be able to provide any work restrictions. I advised her to contatct PCP regarding restrictions in the mean time. She became irritated that she did not have a pulm dx appropriate for work restrictions. She stated this whole process of pulmonary work up began because her work encouraged her to see someone because her cough was unacceptable . She also stated her 02 was still dropping to 88%  with no oxygen. I asked her if she had discontinued her oxygen, she stated it is just to heavy to carry around work , that it is 25 lbs and she is 59 years old and doesn't sit at a desk. I verified with patient that she was given a POC vs tank ( which I walked her on , and she qualified for at last visit) she verbalized that she was on POC but that it was still heavy. I encouraged her to wear oxygen as ordered. I spoke with Jeaneth about this and she was agreeable to see patient tomorrow after hearing from Dr. Hu's recommendations for work restrictions. I advised patient that we would keep appt for tomorrow with Jeaneth, but that any

## 2024-05-30 ENCOUNTER — TELEPHONE (OUTPATIENT)
Dept: PULMONOLOGY | Age: 59
End: 2024-05-30

## 2024-05-30 DIAGNOSIS — R93.89 ABNORMAL CT OF THE CHEST: ICD-10-CM

## 2024-05-30 DIAGNOSIS — J18.9 PNEUMONIA OF BOTH LUNGS DUE TO INFECTIOUS ORGANISM, UNSPECIFIED PART OF LUNG: ICD-10-CM

## 2024-05-30 DIAGNOSIS — R05.3 CHRONIC COUGH: ICD-10-CM

## 2024-05-30 DIAGNOSIS — J42 CHRONIC BRONCHITIS, UNSPECIFIED CHRONIC BRONCHITIS TYPE (HCC): Primary | ICD-10-CM

## 2024-05-30 RX ORDER — IPRATROPIUM BROMIDE AND ALBUTEROL SULFATE 2.5; .5 MG/3ML; MG/3ML
1 SOLUTION RESPIRATORY (INHALATION) EVERY 6 HOURS
Qty: 360 ML | Refills: 11 | Status: SHIPPED | OUTPATIENT
Start: 2024-05-30 | End: 2025-05-30

## 2024-05-30 RX ORDER — BUDESONIDE AND FORMOTEROL FUMARATE DIHYDRATE 160; 4.5 UG/1; UG/1
2 AEROSOL RESPIRATORY (INHALATION) 2 TIMES DAILY
Qty: 6 EACH | Refills: 3 | Status: SHIPPED | OUTPATIENT
Start: 2024-05-30 | End: 2025-05-30

## 2024-05-30 RX ORDER — AZITHROMYCIN 250 MG/1
250 TABLET, FILM COATED ORAL DAILY
Qty: 30 TABLET | Refills: 11 | Status: SHIPPED | OUTPATIENT
Start: 2024-05-30 | End: 2025-05-30

## 2024-05-30 NOTE — TELEPHONE ENCOUNTER
Spoke with MsGabriela Sacha about her constant cough the Augmentin is not helping we will switch to azithromycin to see if this can help some small airway inflammation, again no MAC was grown on BAL.  In addition with her chronic bronchitis high likely COPD, will add Symbicort and see if this helps.  Patient told to wash mouth out after use.  Hypertonic saline nebs seem to be helping significantly we will continue these for now will also switch her albuterol nebulizer to DuoNebs to see if this had some extra help.

## 2024-05-30 NOTE — PROGRESS NOTES
She will need a note stating that she can work at most 4 hours a day at this time. Due to her respiratory issues.     Attila Hu

## 2024-05-30 NOTE — TELEPHONE ENCOUNTER
I spoke with patient and informed her I was creating a note per Dr. Hu for work restrictions. We will have him sign it and then fax to her work number which she stated she will call or message back and provide. When I informed her of this she stated she will also be faxing [facility] paperwork as well.

## 2024-06-03 LAB
AFB CULTURE & SMEAR: NORMAL
AFB CULTURE & SMEAR: NORMAL

## 2024-06-10 LAB
AFB CULTURE & SMEAR: NORMAL
AFB CULTURE & SMEAR: NORMAL

## 2024-06-11 LAB
FUNGUS SPEC CULT: ABNORMAL
FUNGUS SPEC CULT: ABNORMAL
FUNGUS SPEC FUNGUS STN: ABNORMAL
ORGANISM: ABNORMAL
ORGANISM: ABNORMAL

## 2024-06-17 LAB
AFB CULTURE & SMEAR: NORMAL
AFB CULTURE & SMEAR: NORMAL
FUNGUS SPEC CULT: NORMAL
FUNGUS SPEC FUNGUS STN: NORMAL

## 2024-06-19 ENCOUNTER — TELEPHONE (OUTPATIENT)
Dept: PULMONOLOGY | Age: 59
End: 2024-06-19

## 2024-06-19 NOTE — TELEPHONE ENCOUNTER
----- Message from Attila Hu MD sent at 6/13/2024 10:10 AM EDT -----  Regarding: RE: PARK[employer] papers  Contact: [phone]  I did get a PDF, but I cannot edit it on the work computer, nor can I print it. So either we will have to wait until I get back there in July or someone needs to find a way to send it to me where I can edit it.   Attila Hu      ----- Message -----  From: Chantelle Dee LPN  Sent: 6/11/2024  11:00 AM EDT  To: Attila Hu MD  Subject: FW: PARKER gomez                                        ----- Message -----  From: Jayda Goncalves  Sent: 6/11/2024  10:29 AM EDT  To: [facility]  Subject: PARKER papers                                      Did you receive my FMLA Paper's yet from .  Please let me know

## 2024-06-23 LAB — MISC. #1 REFERENCE GROUP TEST: NORMAL

## 2024-06-24 LAB
AFB CULTURE & SMEAR: NORMAL
AFB CULTURE & SMEAR: NORMAL

## 2024-06-25 ENCOUNTER — HOSPITAL ENCOUNTER (OUTPATIENT)
Dept: CT IMAGING | Age: 59
Discharge: HOME | End: 2024-06-25
Attending: INTERNAL MEDICINE
Payer: COMMERCIAL

## 2024-06-25 ENCOUNTER — HOSPITAL ENCOUNTER (OUTPATIENT)
Age: 59
Discharge: HOME | End: 2024-06-27
Attending: INTERNAL MEDICINE
Payer: COMMERCIAL

## 2024-06-25 VITALS
SYSTOLIC BLOOD PRESSURE: 134 MMHG | BODY MASS INDEX: 34.04 KG/M2 | DIASTOLIC BLOOD PRESSURE: 72 MMHG | HEIGHT: 62 IN | WEIGHT: 185 LBS

## 2024-06-25 DIAGNOSIS — R05.3 CHRONIC COUGH: ICD-10-CM

## 2024-06-25 DIAGNOSIS — R91.8 ABNORMAL CT LUNG SCREENING: ICD-10-CM

## 2024-06-25 DIAGNOSIS — R06.02 SHORTNESS OF BREATH: ICD-10-CM

## 2024-06-25 LAB
ECHO AO ASC DIAM: 3 CM
ECHO AO ASCENDING AORTA INDEX: 1.62 CM/M2
ECHO AV CUSP MM: 1.8 CM
ECHO AV PEAK GRADIENT: 5 MMHG
ECHO AV PEAK VELOCITY: 1.2 M/S
ECHO AV VELOCITY RATIO: 0.75
ECHO BSA: 1.92 M2
ECHO EST RA PRESSURE: 5 MMHG
ECHO LA AREA 2C: 7.9 CM2
ECHO LA AREA 4C: 8.9 CM2
ECHO LA DIAMETER INDEX: 1.68 CM/M2
ECHO LA DIAMETER: 3.1 CM
ECHO LA MAJOR AXIS: 3.7 CM
ECHO LA MINOR AXIS: 3.5 CM
ECHO LA VOL BP: 17 ML (ref 22–52)
ECHO LA VOL MOD A2C: 15 ML (ref 22–52)
ECHO LA VOL MOD A4C: 17 ML (ref 22–52)
ECHO LA VOL/BSA BIPLANE: 9 ML/M2 (ref 16–34)
ECHO LA VOLUME INDEX MOD A2C: 8 ML/M2 (ref 16–34)
ECHO LA VOLUME INDEX MOD A4C: 9 ML/M2 (ref 16–34)
ECHO LV E' LATERAL VELOCITY: 8 CM/S
ECHO LV E' SEPTAL VELOCITY: 5 CM/S
ECHO LV FRACTIONAL SHORTENING: 35 % (ref 28–44)
ECHO LV INTERNAL DIMENSION DIASTOLE INDEX: 2.16 CM/M2
ECHO LV INTERNAL DIMENSION DIASTOLIC: 4 CM (ref 3.9–5.3)
ECHO LV INTERNAL DIMENSION SYSTOLIC INDEX: 1.41 CM/M2
ECHO LV INTERNAL DIMENSION SYSTOLIC: 2.6 CM
ECHO LV ISOVOLUMETRIC RELAXATION TIME (IVRT): 63 MS
ECHO LV IVSD: 1 CM (ref 0.6–0.9)
ECHO LV MASS 2D: 101.4 G (ref 67–162)
ECHO LV MASS INDEX 2D: 54.8 G/M2 (ref 43–95)
ECHO LV POSTERIOR WALL DIASTOLIC: 0.7 CM (ref 0.6–0.9)
ECHO LV RELATIVE WALL THICKNESS RATIO: 0.35
ECHO LVOT PEAK GRADIENT: 3 MMHG
ECHO LVOT PEAK VELOCITY: 0.9 M/S
ECHO MV A VELOCITY: 0.85 M/S
ECHO MV E DECELERATION TIME (DT): 219 MS
ECHO MV E VELOCITY: 0.5 M/S
ECHO MV E/A RATIO: 0.59
ECHO MV E/E' LATERAL: 6.25
ECHO MV E/E' RATIO (AVERAGED): 8.13
ECHO MV E/E' SEPTAL: 10
ECHO PULMONARY ARTERY END DIASTOLIC PRESSURE: 5 MMHG
ECHO PV MAX VELOCITY: 0.7 M/S
ECHO PV PEAK GRADIENT: 2 MMHG
ECHO PV REGURGITANT MAX VELOCITY: 1.1 M/S
ECHO RIGHT VENTRICULAR SYSTOLIC PRESSURE (RVSP): 18 MMHG
ECHO RV INTERNAL DIMENSION: 2.7 CM
ECHO RV TAPSE: 1.9 CM (ref 1.7–?)
ECHO TV E WAVE: 0.5 M/S
ECHO TV REGURGITANT MAX VELOCITY: 1.79 M/S
ECHO TV REGURGITANT PEAK GRADIENT: 13 MMHG

## 2024-06-25 PROCEDURE — 93306 TTE W/DOPPLER COMPLETE: CPT

## 2024-06-25 PROCEDURE — 93306 TTE W/DOPPLER COMPLETE: CPT | Performed by: NUCLEAR MEDICINE

## 2024-06-25 PROCEDURE — 71250 CT THORAX DX C-: CPT

## 2024-06-26 ENCOUNTER — PATIENT MESSAGE (OUTPATIENT)
Dept: PULMONOLOGY | Age: 59
End: 2024-06-26

## 2024-06-26 LAB — MISC. #1 REFERENCE GROUP TEST: NORMAL

## 2024-06-27 LAB
FUNGUS SPEC CULT: ABNORMAL
FUNGUS SPEC FUNGUS STN: ABNORMAL
ORGANISM: ABNORMAL

## 2024-07-01 DIAGNOSIS — R05.3 CHRONIC COUGH: ICD-10-CM

## 2024-07-01 DIAGNOSIS — R93.89 ABNORMAL CT OF THE CHEST: ICD-10-CM

## 2024-07-01 DIAGNOSIS — R09.02 HYPOXIA: Primary | ICD-10-CM

## 2024-07-01 DIAGNOSIS — J96.01 ACUTE HYPOXIC RESPIRATORY FAILURE (HCC): ICD-10-CM

## 2024-07-01 LAB
AFB CULTURE & SMEAR: NORMAL
AFB CULTURE & SMEAR: NORMAL

## 2024-07-01 NOTE — TELEPHONE ENCOUNTER
PFTs are booking out a month or two. I called central scheduling and we would not be able to get her in before her follow up on Tuesday the 9th.

## 2024-07-02 NOTE — TELEPHONE ENCOUNTER
Spoke with scheduling and got patient in for a PFT for 7/3/24 at 11am    Called and spoke with patient

## 2024-07-03 ENCOUNTER — HOSPITAL ENCOUNTER (OUTPATIENT)
Dept: PULMONOLOGY | Age: 59
Discharge: HOME | End: 2024-07-03
Attending: INTERNAL MEDICINE
Payer: COMMERCIAL

## 2024-07-03 DIAGNOSIS — R93.89 ABNORMAL CT OF THE CHEST: ICD-10-CM

## 2024-07-03 DIAGNOSIS — R09.02 HYPOXIA: ICD-10-CM

## 2024-07-03 DIAGNOSIS — R05.3 CHRONIC COUGH: ICD-10-CM

## 2024-07-03 PROCEDURE — 94060 EVALUATION OF WHEEZING: CPT

## 2024-07-03 PROCEDURE — 94729 DIFFUSING CAPACITY: CPT

## 2024-07-03 PROCEDURE — 94726 PLETHYSMOGRAPHY LUNG VOLUMES: CPT

## 2024-07-09 ENCOUNTER — NURSE ONLY (OUTPATIENT)
Dept: LAB | Age: 59
End: 2024-07-09

## 2024-07-09 ENCOUNTER — OFFICE VISIT (OUTPATIENT)
Dept: PULMONOLOGY | Age: 59
End: 2024-07-09
Payer: COMMERCIAL

## 2024-07-09 VITALS
SYSTOLIC BLOOD PRESSURE: 130 MMHG | TEMPERATURE: 97.7 F | HEART RATE: 87 BPM | WEIGHT: 188.4 LBS | HEIGHT: 62 IN | DIASTOLIC BLOOD PRESSURE: 78 MMHG | BODY MASS INDEX: 34.67 KG/M2 | OXYGEN SATURATION: 94 %

## 2024-07-09 DIAGNOSIS — R06.02 SHORTNESS OF BREATH: ICD-10-CM

## 2024-07-09 DIAGNOSIS — B44.89 INFECTION BY ASPERGILLUS FUMIGATUS (HCC): ICD-10-CM

## 2024-07-09 DIAGNOSIS — R05.3 CHRONIC COUGH: ICD-10-CM

## 2024-07-09 DIAGNOSIS — J98.19 LUNG COLLAPSE: ICD-10-CM

## 2024-07-09 DIAGNOSIS — K21.9 GASTROESOPHAGEAL REFLUX DISEASE WITHOUT ESOPHAGITIS: ICD-10-CM

## 2024-07-09 DIAGNOSIS — R09.02 HYPOXIA: Primary | ICD-10-CM

## 2024-07-09 DIAGNOSIS — G47.33 OSA ON CPAP: ICD-10-CM

## 2024-07-09 DIAGNOSIS — R09.02 HYPOXIA: ICD-10-CM

## 2024-07-09 PROCEDURE — 99214 OFFICE O/P EST MOD 30 MIN: CPT | Performed by: INTERNAL MEDICINE

## 2024-07-09 ASSESSMENT — ENCOUNTER SYMPTOMS
TROUBLE SWALLOWING: 0
VOMITING: 0
COUGH: 1
HEMOPTYSIS: 0
VOICE CHANGE: 0
SHORTNESS OF BREATH: 1
HEARTBURN: 0
WHEEZING: 0

## 2024-07-09 NOTE — PROGRESS NOTES
[facility]    Patient: BABS MERCER, 58 y.o.   : 1965  2024         Subjective     Chief Complaint   Patient presents with    Follow-up     2 month chronic cough after bronchoscopy with echo, pft + high-resolution ct chest        Cough  This is a chronic problem. Episode onset: started around . The problem has been resolved (Initially was much worse, had bronc with BAL 2024.  Admitted shortly after.  But since has rapidly improved). The cough is Productive of sputum (No longer productive of sputum). Associated symptoms include shortness of breath (only after coughing). Pertinent negatives include no chest pain, fever, heartburn, hemoptysis, nasal congestion, postnasal drip, rash, weight loss (almost 10 lbs) or wheezing (was wheezing before but has started to stop). Associated symptoms comments: No longer with weight loss or wheezing. The symptoms are aggravated by lying down. Risk factors: Exposure to some cleaning solvents. She has tried oral steroids, steroid inhaler, a beta-agonist inhaler and ipratropium inhaler (tried allergy meds, steroids, nebulizers, hypertonic nebulizers, azithromycin, prolonged course antibiotics) for the symptoms. Improvement on treatment: Initially not much improvement, but has resolved. Her past medical history is significant for bronchiectasis (Some mild bronchiectasis in the lower lobes). Former smoker     History:   No significant family history of pulmonary issues.  Besides sister with similar episode, also related to cleaning solvents.  No prior lung issues per patient.  Does have smoking history however quit .  Not immunosuppressed or immunocompromised.     Did have LDCT chest performed for lung cancer screening which did show some tree-in-bud opacities some mild bronchiectatic changes in the lower lobes and right middle lobe.  Was bringing up significant mucus with her cough, with weight loss.      Had bronc with BAL 2024.

## 2024-07-12 DIAGNOSIS — R09.02 HYPOXIA: Primary | ICD-10-CM

## 2024-07-13 LAB — ASPERGILLUS AB TITR SER CF: NORMAL {TITER}

## 2024-07-30 ENCOUNTER — TELEPHONE (OUTPATIENT)
Dept: CARDIAC REHAB | Age: 59
End: 2024-07-30

## 2024-07-30 ENCOUNTER — TELEPHONE (OUTPATIENT)
Dept: PULMONOLOGY | Age: 59
End: 2024-07-30

## 2024-07-30 DIAGNOSIS — J47.0 BRONCHIECTASIS WITH ACUTE LOWER RESPIRATORY INFECTION (HCC): Primary | ICD-10-CM

## 2024-07-30 NOTE — TELEPHONE ENCOUNTER
PULMONARY REHABILITATION REFERRAL  COPD Exacerbation    Pulmonary Rehab Evaluation order received.  Spoke with patient about the benefits of supervised comfortable, progressive exercise with oxygen saturation monitoring and pulmonary education.  Pt is wanting to get started as soon as possible. We scheduled first appt for 8/15 at 2pm- pt knows to come to heart center on 2nd floor of [facility] for the appt.  Pt had no questions at this time.

## 2024-08-12 DIAGNOSIS — J47.9 BRONCHIECTASIS WITHOUT COMPLICATION (HCC): Primary | ICD-10-CM

## 2024-08-14 ENCOUNTER — TELEPHONE (OUTPATIENT)
Dept: CARDIAC REHAB | Age: 59
End: 2024-08-14

## 2024-08-14 NOTE — TELEPHONE ENCOUNTER
Spoke with patient and confirmed pulmonary rehab eval for 8/15/24 at 2:00pm. Updated patient on insurance information.

## 2024-08-15 ENCOUNTER — HOSPITAL ENCOUNTER (OUTPATIENT)
Dept: CARDIAC REHAB | Age: 59
Setting detail: THERAPIES SERIES
Discharge: HOME | End: 2024-08-15
Payer: COMMERCIAL

## 2024-08-15 VITALS — BODY MASS INDEX: 33.93 KG/M2 | HEIGHT: 62 IN | WEIGHT: 184.4 LBS

## 2024-08-15 PROCEDURE — 97150 GROUP THERAPEUTIC PROCEDURES: CPT

## 2024-08-15 RX ORDER — CETIRIZINE HYDROCHLORIDE 10 MG/1
10 TABLET ORAL DAILY
COMMUNITY

## 2024-08-15 RX ORDER — HYDROCHLOROTHIAZIDE 12.5 MG/1
12.5 TABLET ORAL PRN
COMMUNITY

## 2024-08-15 NOTE — PROGRESS NOTES
had class      () Final weight goal achieved  () Pt did not reach desired weight goal - (readdressed nutrition and exercise strategies for future goal attainment)        Nutrition Intervention/ Education   Initial Assessment    (x) Pt needs Nutrition education class        () Pt states does not need class       Nutrition Intervention/ Education  Plan      () Pt will have Nutrition class          () Encourage pt to continue to learn and incorporate self knowledge in to diet choices   Nutrition Intervention/ Education  Reassessment      () Pt has had class  () Pt has not had class      () Pt had questions  () Pt denies having questions Nutrition Intervention/ Education  Reassessment      () Pt has had class  () Pt has not had class      () Pt had questions  () Pt denies having questions Nutrition Intervention/ Education  Reassessment      () Pt has had class  () Pt has not had class      () Pt had questions  () Pt denies having questions Nutrition Intervention/ Education   Discharge      () Pt has had class  Date: See above  () Pt has not had class - Reason:    () Pt had questions that were answered   () Pt denies having questions             PSYCHOSOCIAL INITIAL ASSESSMENT    Depression:  () Self Reported  () In History  () S/Sx noted  () Denies  (x) On Medication- on Celexa per DR post ovaries removed to help with menopause symptoms.   (x) Follows physician        (x) Give PHQ-9 Depression screening tool                Dyspnea:  (x) Give pt UC[employer] SOB survey    (x) Pt gets SOB during activity and with ADLs        (x) Pt has support from home for the program      () Pt does not have support for the program   PSYCHOSOCIAL  PLAN      () Attend Stress/ Depression/ Anxiety Class                  Pre Rehab PHQ-9   Score: 3  1-4 Normal  5-9 Mild  10-14 Mod  15-19 Mod-Sev  >19 Severe      Pre Rehab UCSD SOB Score: 39    (x) Attend classes and attend rehab to increase strength and endurance    -Attend Psychosocial

## 2024-08-28 ENCOUNTER — OFFICE VISIT (OUTPATIENT)
Dept: PULMONOLOGY | Age: 59
End: 2024-08-28
Payer: COMMERCIAL

## 2024-08-28 VITALS
WEIGHT: 185.4 LBS | DIASTOLIC BLOOD PRESSURE: 88 MMHG | OXYGEN SATURATION: 92 % | BODY MASS INDEX: 34.12 KG/M2 | TEMPERATURE: 97.4 F | HEART RATE: 87 BPM | HEIGHT: 62 IN | SYSTOLIC BLOOD PRESSURE: 132 MMHG

## 2024-08-28 DIAGNOSIS — R05.3 CHRONIC COUGH: ICD-10-CM

## 2024-08-28 DIAGNOSIS — G47.33 OSA ON CPAP: Primary | ICD-10-CM

## 2024-08-28 DIAGNOSIS — G47.34 NOCTURNAL HYPOXIA: ICD-10-CM

## 2024-08-28 DIAGNOSIS — E66.01 CLASS 2 SEVERE OBESITY DUE TO EXCESS CALORIES WITH SERIOUS COMORBIDITY AND BODY MASS INDEX (BMI) OF 35.0 TO 35.9 IN ADULT (HCC): Chronic | ICD-10-CM

## 2024-08-28 PROCEDURE — 99215 OFFICE O/P EST HI 40 MIN: CPT | Performed by: NURSE PRACTITIONER

## 2024-08-28 ASSESSMENT — ENCOUNTER SYMPTOMS
SHORTNESS OF BREATH: 1
COUGH: 1

## 2024-08-28 NOTE — PROGRESS NOTES
reviewed and are negative.       Physical exam   /88 (Site: Right Upper Arm, Position: Sitting, Cuff Size: Medium Adult)   Pulse 87   Temp 97.4 °F (36.3 °C) (Skin)   Ht 1.575 m (5' 2\")   Wt 84.1 kg (185 lb 6.4 oz)   SpO2 92% Comment: Patient on room air  BMI 33.91 kg/m²      Physical Exam  Vitals and nursing note reviewed.   Constitutional:       Appearance: Normal appearance. She is overweight.   HENT:      Head: Normocephalic and atraumatic.      Mouth/Throat:      Pharynx: Oropharynx is clear.   Eyes:      Conjunctiva/sclera: Conjunctivae normal.   Pulmonary:      Effort: Pulmonary effort is normal. No tachypnea, bradypnea or respiratory distress.   Skin:     Findings: No erythema or rash.   Neurological:      Mental Status: She is alert and oriented to person, place, and time.   Psychiatric:         Attention and Perception: Attention normal.         Mood and Affect: Mood normal.         Speech: Speech normal.         Behavior: Behavior normal.         Thought Content: Thought content normal.         Cognition and Memory: Cognition normal.         Judgment: Judgment normal.          Sleep Study   . Home sleep study demonstrates moderate sleep apnea with an AHI of 27.8; patients lowest oxygen saturation was 70% with an oxygen saturation <88% for 2hrs 23 mins.    Assessment      Diagnosis Orders   1. HERMILO on CPAP        2. Nocturnal hypoxia        3. Chronic cough        4. Class 2 severe obesity due to excess calories with serious comorbidity and body mass index (BMI) of 35.0 to 35.9 in adult ([facility])             Plan   Jayda was seen today for new patient.    Diagnoses and all orders for this visit:    HERMILO on CPAP- uncontrolled due to intolerance to current pressures   Settings changed in office today , current setting : APAP [id number] ( lowered from max pressure of 20 )   Adjusted RAMP and Ramp time .   Encouraged use of CPAP every night with minimum 4 hours of usage nightly with goal of sleeping and  using CPAP 7-9 hours   Educated on health risks associated with untreated sleep apnea     Nocturnal hypoxia- due to HERMILO vs other   Resume CPAP use, once compliant use will need overnight pulse ox on settings to ensure adequate oxygen levels    Chronic cough- improving   Keep f/u's with pulmonary clinic     Class 2 severe obesity due to excess calories with serious comorbidity and body mass index (BMI) of 35.0 to 35.9 in adult (HCC)- stable   Pt counseled on obesity, health risks associated with obesity and counseled on need for weight reduction.     [place] [facility]  8/28/2024     Billing based on time, total time spent on encounter was 40 min   Electronically signed by EDILBERTO Aguilar CNP on 8/28/2024 at 10:37 AM

## 2024-09-03 ENCOUNTER — HOSPITAL ENCOUNTER (OUTPATIENT)
Dept: CARDIAC REHAB | Age: 59
Setting detail: THERAPIES SERIES
Discharge: HOME | End: 2024-09-03
Payer: COMMERCIAL

## 2024-09-03 PROCEDURE — 97150 GROUP THERAPEUTIC PROCEDURES: CPT

## 2024-09-05 ENCOUNTER — HOSPITAL ENCOUNTER (OUTPATIENT)
Dept: CARDIAC REHAB | Age: 59
Setting detail: THERAPIES SERIES
Discharge: HOME | End: 2024-09-05
Payer: COMMERCIAL

## 2024-09-05 PROCEDURE — 97150 GROUP THERAPEUTIC PROCEDURES: CPT

## 2024-09-10 ENCOUNTER — HOSPITAL ENCOUNTER (OUTPATIENT)
Dept: CARDIAC REHAB | Age: 59
Setting detail: THERAPIES SERIES
Discharge: HOME | End: 2024-09-10
Payer: COMMERCIAL

## 2024-09-12 ENCOUNTER — HOSPITAL ENCOUNTER (OUTPATIENT)
Dept: CARDIAC REHAB | Age: 59
Setting detail: THERAPIES SERIES
Discharge: HOME | End: 2024-09-12
Payer: COMMERCIAL

## 2024-09-12 PROCEDURE — 97150 GROUP THERAPEUTIC PROCEDURES: CPT

## 2024-09-17 ENCOUNTER — HOSPITAL ENCOUNTER (OUTPATIENT)
Dept: CARDIAC REHAB | Age: 59
Setting detail: THERAPIES SERIES
Discharge: HOME | End: 2024-09-17
Payer: COMMERCIAL

## 2024-09-17 PROCEDURE — 97150 GROUP THERAPEUTIC PROCEDURES: CPT

## 2024-09-19 ENCOUNTER — HOSPITAL ENCOUNTER (OUTPATIENT)
Dept: CARDIAC REHAB | Age: 59
Setting detail: THERAPIES SERIES
Discharge: HOME | End: 2024-09-19
Payer: COMMERCIAL

## 2024-09-19 PROCEDURE — 97150 GROUP THERAPEUTIC PROCEDURES: CPT

## 2024-09-24 ENCOUNTER — APPOINTMENT (OUTPATIENT)
Dept: CARDIAC REHAB | Age: 59
End: 2024-09-24
Payer: COMMERCIAL

## 2024-09-26 ENCOUNTER — HOSPITAL ENCOUNTER (OUTPATIENT)
Dept: CARDIAC REHAB | Age: 59
Setting detail: THERAPIES SERIES
Discharge: HOME | End: 2024-09-26
Payer: COMMERCIAL

## 2024-09-26 PROCEDURE — 97150 GROUP THERAPEUTIC PROCEDURES: CPT

## 2024-10-01 ENCOUNTER — APPOINTMENT (OUTPATIENT)
Dept: CARDIAC REHAB | Age: 59
End: 2024-10-01
Payer: COMMERCIAL

## 2024-10-01 DIAGNOSIS — J18.9 PNEUMONIA OF BOTH LUNGS DUE TO INFECTIOUS ORGANISM, UNSPECIFIED PART OF LUNG: ICD-10-CM

## 2024-10-01 DIAGNOSIS — R93.89 ABNORMAL CT OF THE CHEST: Primary | ICD-10-CM

## 2024-10-03 ENCOUNTER — HOSPITAL ENCOUNTER (OUTPATIENT)
Dept: CARDIAC REHAB | Age: 59
Setting detail: THERAPIES SERIES
Discharge: HOME | End: 2024-10-03
Payer: COMMERCIAL

## 2024-10-03 PROCEDURE — 97150 GROUP THERAPEUTIC PROCEDURES: CPT

## 2024-10-08 ENCOUNTER — HOSPITAL ENCOUNTER (OUTPATIENT)
Dept: CARDIAC REHAB | Age: 59
Setting detail: THERAPIES SERIES
Discharge: HOME | End: 2024-10-08
Payer: COMMERCIAL

## 2024-10-08 PROCEDURE — 97150 GROUP THERAPEUTIC PROCEDURES: CPT

## 2024-10-10 ENCOUNTER — HOSPITAL ENCOUNTER (OUTPATIENT)
Dept: CARDIAC REHAB | Age: 59
Setting detail: THERAPIES SERIES
Discharge: HOME | End: 2024-10-10
Payer: COMMERCIAL

## 2024-10-10 PROCEDURE — 97150 GROUP THERAPEUTIC PROCEDURES: CPT

## 2024-10-10 NOTE — PROGRESS NOTES
the rehab room to see the patient during this 30 day reassessmen  (x) Continue with the current program  () Continue, but with the following changes:  () Discharge patient      Cosigned and dated below: PHYSICIAN INTERACTION    MD 30 day Reassessment Review:    (x) I have been in the rehab room to see the patient during this 30 day reassessmen  (x) Continue with the current program  () Continue, but with the following changes:  () Discharge patient      Cosigned and dated below: PHYSICIAN INTERACTION    MD 30 day Reassessment Review:    (x) I have been in the rehab room to see the patient during this 30 day reassessmen  (x) Continue with the current program  () Continue, but with the following changes:  () Discharge patient      Cosigned and dated below: PHYSICIAN INTERACTION    MD 30 day   Discharge Review:    (x) Discharge patient                            Cosigned and dated below:     Cosigned by: Yevgeniy Hanley MD at 8/15/2024  3:36 PM     Revision History    Date/Time User Provider Type Action   8/15/2024  3:36 PM Yevgeniy Hanley MD Physician Cosign   8/15/2024  3:09 PM Pauline Montaño RCP Respiratory Therapist Sign        Cosigned by: William Melgar MD at 9/11/2024  8:27 AM     Revision History    Date/Time User Provider Type Action   9/11/2024  8:27 AM William Melgar MD Physician Cosign   9/10/2024  1:46 PM Mago Sanchez EP Exercise Physiologist Sign

## 2024-10-15 ENCOUNTER — HOSPITAL ENCOUNTER (OUTPATIENT)
Dept: CARDIAC REHAB | Age: 59
Setting detail: THERAPIES SERIES
End: 2024-10-15
Payer: COMMERCIAL

## 2024-10-22 ENCOUNTER — HOSPITAL ENCOUNTER (OUTPATIENT)
Dept: CARDIAC REHAB | Age: 59
Setting detail: THERAPIES SERIES
Discharge: HOME | End: 2024-10-22
Payer: COMMERCIAL

## 2024-10-22 PROCEDURE — 97150 GROUP THERAPEUTIC PROCEDURES: CPT

## 2024-10-24 ENCOUNTER — HOSPITAL ENCOUNTER (OUTPATIENT)
Dept: CARDIAC REHAB | Age: 59
Setting detail: THERAPIES SERIES
Discharge: HOME | End: 2024-10-24
Payer: COMMERCIAL

## 2024-10-24 PROCEDURE — 97150 GROUP THERAPEUTIC PROCEDURES: CPT

## 2024-10-29 ENCOUNTER — HOSPITAL ENCOUNTER (OUTPATIENT)
Dept: CARDIAC REHAB | Age: 59
Setting detail: THERAPIES SERIES
Discharge: HOME | End: 2024-10-29
Payer: COMMERCIAL

## 2024-10-29 PROCEDURE — 97150 GROUP THERAPEUTIC PROCEDURES: CPT

## 2024-10-31 ENCOUNTER — APPOINTMENT (OUTPATIENT)
Dept: CARDIAC REHAB | Age: 59
End: 2024-10-31
Payer: COMMERCIAL

## 2024-11-05 ENCOUNTER — HOSPITAL ENCOUNTER (OUTPATIENT)
Dept: CARDIAC REHAB | Age: 59
Setting detail: THERAPIES SERIES
Discharge: HOME | End: 2024-11-05
Payer: COMMERCIAL

## 2024-11-05 PROCEDURE — 97150 GROUP THERAPEUTIC PROCEDURES: CPT

## 2024-11-07 ENCOUNTER — HOSPITAL ENCOUNTER (OUTPATIENT)
Dept: CARDIAC REHAB | Age: 59
Setting detail: THERAPIES SERIES
Discharge: HOME | End: 2024-11-07
Payer: COMMERCIAL

## 2024-11-07 PROCEDURE — 97150 GROUP THERAPEUTIC PROCEDURES: CPT

## 2024-11-12 ENCOUNTER — HOSPITAL ENCOUNTER (OUTPATIENT)
Dept: CARDIAC REHAB | Age: 59
Setting detail: THERAPIES SERIES
Discharge: HOME | End: 2024-11-12
Payer: COMMERCIAL

## 2024-11-12 PROCEDURE — 97150 GROUP THERAPEUTIC PROCEDURES: CPT

## 2024-11-12 NOTE — PROGRESS NOTES
easier/ able to go longer/ not get as SOB   ADL Goals   Discharge        Goal achieved  () Yes  () No        Goal achieved  () Yes  () No          Outcomes:  See Health and Functioning from the CAT tool and Strength and Endurance from 6 MWD above               EXACERBATION PREVENTION & MANAGEMENT  INITIAL ASSESSMENT    Pt reports;  () 0 respiratory infections  (x) 1   () >2  (x) Hospitalized in last 12 months-was in for 3 days.    EXACERBATION PREVENTION & MANAGEMENT  PLAN      Address via Disease Self Management and Exacerbation prevention class:    -Hydration for mucus    -Hand Hygiene          -Evaluation of Sputum    -When to call MD  -S/Sx to report  -Decrease exposure to irritants/ exacerbators    -Cleaning of respiratory equipment   EXACERBATION PREVENTION & MANAGEMENT  RE ASSESSMENT    () Pt has had class  (x) Pt has not had class      () Improved hydration    () Correct hand washing tech’s and alcohol gel usage    () Sputum assessment    () Ability to recognize exacerbation and when to call MD        () Cleaning of respiratory equipment EXACERBATION PREVENTION & MANAGEMENT  RE ASSESSMENT    () Pt has had class  (x) Pt has not had class      () Improved hydration    () Correct hand washing tech’s and alcohol gel usage    () Sputum assessment    () Ability to recognize exacerbation and when to call MD        () Cleaning of respiratory equipment EXACERBATION PREVENTION & MANAGEMENT  RE ASSESSMENT    () Pt has had class  () Pt has not had class      () Improved hydration    () Correct hand washing tech’s and alcohol gel usage    () Sputum assessment    () Ability to recognize exacerbation and when to call MD        () Cleaning of respiratory equipment EXACERBATION PREVENTION & MANAGEMENT  DISCHARGE      () Pt has had class  Date:  () Pt did not have class              () Addressed questions and pt feels comfortable with hydration, hand washing, sputum assessment, exacerbation knowledge, and cleaning of

## 2024-11-14 ENCOUNTER — APPOINTMENT (OUTPATIENT)
Dept: CARDIAC REHAB | Age: 59
End: 2024-11-14
Payer: COMMERCIAL

## 2024-11-19 ENCOUNTER — HOSPITAL ENCOUNTER (OUTPATIENT)
Dept: CARDIAC REHAB | Age: 59
Setting detail: THERAPIES SERIES
Discharge: HOME | End: 2024-11-19
Payer: COMMERCIAL

## 2024-11-19 PROCEDURE — 97150 GROUP THERAPEUTIC PROCEDURES: CPT

## 2024-11-21 ENCOUNTER — APPOINTMENT (OUTPATIENT)
Dept: CARDIAC REHAB | Age: 59
End: 2024-11-21
Payer: COMMERCIAL

## 2024-11-26 ENCOUNTER — HOSPITAL ENCOUNTER (OUTPATIENT)
Dept: CARDIAC REHAB | Age: 59
Setting detail: THERAPIES SERIES
Discharge: HOME | End: 2024-11-26
Payer: COMMERCIAL

## 2024-11-26 PROCEDURE — 97150 GROUP THERAPEUTIC PROCEDURES: CPT

## 2024-11-28 ENCOUNTER — APPOINTMENT (OUTPATIENT)
Dept: CARDIAC REHAB | Age: 59
End: 2024-11-28
Payer: COMMERCIAL

## 2024-12-03 ENCOUNTER — HOSPITAL ENCOUNTER (OUTPATIENT)
Dept: CT IMAGING | Age: 59
Discharge: HOME | End: 2024-12-03
Attending: INTERNAL MEDICINE
Payer: COMMERCIAL

## 2024-12-03 ENCOUNTER — HOSPITAL ENCOUNTER (OUTPATIENT)
Dept: CARDIAC REHAB | Age: 59
Setting detail: THERAPIES SERIES
Discharge: HOME | End: 2024-12-03
Payer: COMMERCIAL

## 2024-12-03 DIAGNOSIS — J18.9 PNEUMONIA OF BOTH LUNGS DUE TO INFECTIOUS ORGANISM, UNSPECIFIED PART OF LUNG: ICD-10-CM

## 2024-12-03 DIAGNOSIS — R93.89 ABNORMAL CT OF THE CHEST: ICD-10-CM

## 2024-12-03 PROCEDURE — 97150 GROUP THERAPEUTIC PROCEDURES: CPT

## 2024-12-03 PROCEDURE — 71250 CT THORAX DX C-: CPT

## 2024-12-05 ENCOUNTER — APPOINTMENT (OUTPATIENT)
Dept: CARDIAC REHAB | Age: 59
End: 2024-12-05
Payer: COMMERCIAL

## 2024-12-10 ENCOUNTER — HOSPITAL ENCOUNTER (OUTPATIENT)
Dept: CARDIAC REHAB | Age: 59
Setting detail: THERAPIES SERIES
Discharge: HOME | End: 2024-12-10
Payer: COMMERCIAL

## 2024-12-10 PROCEDURE — 97150 GROUP THERAPEUTIC PROCEDURES: CPT

## 2024-12-12 ENCOUNTER — APPOINTMENT (OUTPATIENT)
Dept: CARDIAC REHAB | Age: 59
End: 2024-12-12
Payer: COMMERCIAL

## 2024-12-16 ENCOUNTER — OFFICE VISIT (OUTPATIENT)
Dept: PULMONOLOGY | Age: 59
End: 2024-12-16
Payer: COMMERCIAL

## 2024-12-16 VITALS
HEIGHT: 62 IN | BODY MASS INDEX: 35.15 KG/M2 | SYSTOLIC BLOOD PRESSURE: 116 MMHG | TEMPERATURE: 97.7 F | WEIGHT: 191 LBS | OXYGEN SATURATION: 95 % | DIASTOLIC BLOOD PRESSURE: 74 MMHG | HEART RATE: 81 BPM

## 2024-12-16 DIAGNOSIS — R93.89 ABNORMAL CT OF THE CHEST: Primary | ICD-10-CM

## 2024-12-16 DIAGNOSIS — J47.9 BRONCHIECTASIS WITHOUT COMPLICATION (HCC): ICD-10-CM

## 2024-12-16 PROCEDURE — 99213 OFFICE O/P EST LOW 20 MIN: CPT | Performed by: INTERNAL MEDICINE

## 2024-12-16 ASSESSMENT — ENCOUNTER SYMPTOMS
HEMOPTYSIS: 0
COUGH: 1
WHEEZING: 0
TROUBLE SWALLOWING: 0
VOICE CHANGE: 0
SHORTNESS OF BREATH: 1
HEARTBURN: 0
VOMITING: 0

## 2024-12-16 NOTE — PROGRESS NOTES
[facility]    Patient: BABS MERCER, 59 y.o.   : 1965  2024         Subjective     Chief Complaint   Patient presents with    Follow-up     6 month Chronic cough         Cough  This is a chronic problem. Episode onset: started around . The problem has been resolved (Initially was much worse, had bronc with BAL 2024.  Admitted shortly after.  But since has rapidly improved). The cough is Productive of sputum (No longer productive of sputum). Associated symptoms include shortness of breath (only after coughing). Pertinent negatives include no chest pain, fever, heartburn, hemoptysis, nasal congestion, postnasal drip, rash, weight loss (almost 10 lbs) or wheezing (was wheezing before but has started to stop). Associated symptoms comments: No longer with weight loss or wheezing. The symptoms are aggravated by lying down. Risk factors: Exposure to some cleaning solvents. She has tried oral steroids, steroid inhaler, a beta-agonist inhaler and ipratropium inhaler (tried allergy meds, steroids, nebulizers, hypertonic nebulizers, azithromycin, prolonged course antibiotics) for the symptoms. The treatment provided significant (no longer requiring any treatments) relief. Her past medical history is significant for bronchiectasis (Some mild bronchiectasis in the lower lobes). Former smoker     History:   No significant family history of pulmonary issues.  Besides sister with similar episode, also related to cleaning solvents.  No prior lung issues per patient.  Does have smoking history however quit .  Not immunosuppressed or immunocompromised.     Did have LDCT chest performed for lung cancer screening which did show some tree-in-bud opacities some mild bronchiectatic changes in the lower lobes and right middle lobe.  Was bringing up significant mucus with her cough, with weight loss.      Had bronc with BAL 2024.  Admitted shortly after.  Some Edac seen on bronc,

## 2024-12-17 ENCOUNTER — HOSPITAL ENCOUNTER (OUTPATIENT)
Dept: CARDIAC REHAB | Age: 59
Setting detail: THERAPIES SERIES
End: 2024-12-17
Payer: COMMERCIAL

## 2024-12-19 ENCOUNTER — APPOINTMENT (OUTPATIENT)
Dept: CARDIAC REHAB | Age: 59
End: 2024-12-19
Payer: COMMERCIAL

## 2024-12-24 ENCOUNTER — APPOINTMENT (OUTPATIENT)
Dept: CARDIAC REHAB | Age: 59
End: 2024-12-24
Payer: COMMERCIAL

## 2024-12-26 ENCOUNTER — HOSPITAL ENCOUNTER (OUTPATIENT)
Dept: CARDIAC REHAB | Age: 59
Setting detail: THERAPIES SERIES
End: 2024-12-26
Payer: COMMERCIAL

## 2025-01-20 ENCOUNTER — PATIENT MESSAGE (OUTPATIENT)
Dept: PULMONOLOGY | Age: 60
End: 2025-01-20

## 2025-01-20 RX ORDER — CETIRIZINE HYDROCHLORIDE 10 MG/1
10 TABLET ORAL DAILY
Qty: 30 TABLET | Refills: 11 | Status: SHIPPED | OUTPATIENT
Start: 2025-01-20 | End: 2026-01-20

## 2025-08-01 ENCOUNTER — TELEPHONE (OUTPATIENT)
Dept: PULMONOLOGY | Age: 60
End: 2025-08-01

## 2025-08-01 NOTE — TELEPHONE ENCOUNTER
Called Ms Goncalves to let her know her CT scan was stable without anything concerning in it for now. Will see her in follow up.

## 2025-08-27 ENCOUNTER — OFFICE VISIT (OUTPATIENT)
Dept: PULMONOLOGY | Age: 60
End: 2025-08-27
Payer: COMMERCIAL

## 2025-08-27 VITALS
HEIGHT: 62 IN | DIASTOLIC BLOOD PRESSURE: 76 MMHG | TEMPERATURE: 97.5 F | BODY MASS INDEX: 35.48 KG/M2 | HEART RATE: 69 BPM | SYSTOLIC BLOOD PRESSURE: 126 MMHG | WEIGHT: 192.8 LBS | OXYGEN SATURATION: 97 %

## 2025-08-27 DIAGNOSIS — J47.9 BRONCHIECTASIS WITHOUT COMPLICATION (HCC): ICD-10-CM

## 2025-08-27 DIAGNOSIS — Z87.891 PERSONAL HISTORY OF TOBACCO USE: ICD-10-CM

## 2025-08-27 DIAGNOSIS — I25.10 CORONARY ARTERY DISEASE INVOLVING NATIVE CORONARY ARTERY WITHOUT ANGINA PECTORIS, UNSPECIFIED WHETHER NATIVE OR TRANSPLANTED HEART: ICD-10-CM

## 2025-08-27 DIAGNOSIS — R93.89 ABNORMAL CT OF THE CHEST: Primary | ICD-10-CM

## 2025-08-27 PROCEDURE — G0296 VISIT TO DETERM LDCT ELIG: HCPCS | Performed by: INTERNAL MEDICINE

## 2025-08-27 PROCEDURE — 99214 OFFICE O/P EST MOD 30 MIN: CPT | Performed by: INTERNAL MEDICINE

## 2025-08-27 RX ORDER — ROSUVASTATIN CALCIUM 40 MG/1
40 TABLET, COATED ORAL DAILY
COMMUNITY
Start: 2025-08-12

## 2025-08-27 RX ORDER — ASPIRIN 81 MG/1
81 TABLET ORAL DAILY
COMMUNITY
Start: 2025-08-12 | End: 2026-08-12

## 2025-08-27 ASSESSMENT — ENCOUNTER SYMPTOMS
HEMOPTYSIS: 0
COUGH: 1
VOMITING: 0
TROUBLE SWALLOWING: 0
HEARTBURN: 0
WHEEZING: 0
SHORTNESS OF BREATH: 0
VOICE CHANGE: 0
SORE THROAT: 0

## (undated) DEVICE — FLEXIBLE ENDOSCOPE AND SPECIMEN SAMPLING SYSTEM: Brand: ASCOPE™ 5 BRONCHO 4.2/2.2 SAMPLER SET